# Patient Record
Sex: FEMALE | Race: WHITE | NOT HISPANIC OR LATINO | Employment: PART TIME | URBAN - METROPOLITAN AREA
[De-identification: names, ages, dates, MRNs, and addresses within clinical notes are randomized per-mention and may not be internally consistent; named-entity substitution may affect disease eponyms.]

---

## 2017-08-04 ENCOUNTER — RESULTS ENCOUNTER (OUTPATIENT)
Dept: INTERNAL MEDICINE | Facility: CLINIC | Age: 37
End: 2017-08-04

## 2017-08-04 ENCOUNTER — OFFICE VISIT (OUTPATIENT)
Dept: INTERNAL MEDICINE | Facility: CLINIC | Age: 37
End: 2017-08-04

## 2017-08-04 VITALS
WEIGHT: 181 LBS | HEART RATE: 105 BPM | HEIGHT: 61 IN | BODY MASS INDEX: 34.17 KG/M2 | DIASTOLIC BLOOD PRESSURE: 86 MMHG | SYSTOLIC BLOOD PRESSURE: 128 MMHG | OXYGEN SATURATION: 97 % | TEMPERATURE: 99.5 F

## 2017-08-04 DIAGNOSIS — G89.4 CHRONIC PAIN SYNDROME: ICD-10-CM

## 2017-08-04 DIAGNOSIS — M79.7 FIBROMYALGIA: ICD-10-CM

## 2017-08-04 DIAGNOSIS — F41.1 GAD (GENERALIZED ANXIETY DISORDER): ICD-10-CM

## 2017-08-04 DIAGNOSIS — R21 BUTTERFLY RASH: ICD-10-CM

## 2017-08-04 DIAGNOSIS — R10.9 ABDOMINAL CRAMPING: ICD-10-CM

## 2017-08-04 DIAGNOSIS — Z00.00 HEALTH CARE MAINTENANCE: ICD-10-CM

## 2017-08-04 DIAGNOSIS — D64.9 ANEMIA, UNSPECIFIED TYPE: ICD-10-CM

## 2017-08-04 DIAGNOSIS — Z00.00 HEALTH CARE MAINTENANCE: Primary | ICD-10-CM

## 2017-08-04 DIAGNOSIS — K44.9 HIATAL HERNIA: ICD-10-CM

## 2017-08-04 DIAGNOSIS — R53.82 CHRONIC FATIGUE: ICD-10-CM

## 2017-08-04 DIAGNOSIS — F32.1 MODERATE SINGLE CURRENT EPISODE OF MAJOR DEPRESSIVE DISORDER (HCC): ICD-10-CM

## 2017-08-04 DIAGNOSIS — Z79.899 CONTROLLED SUBSTANCE AGREEMENT SIGNED: ICD-10-CM

## 2017-08-04 DIAGNOSIS — K21.9 GASTROESOPHAGEAL REFLUX DISEASE WITHOUT ESOPHAGITIS: ICD-10-CM

## 2017-08-04 DIAGNOSIS — G47.419 PRIMARY NARCOLEPSY WITHOUT CATAPLEXY: ICD-10-CM

## 2017-08-04 PROCEDURE — 99385 PREV VISIT NEW AGE 18-39: CPT | Performed by: INTERNAL MEDICINE

## 2017-08-04 RX ORDER — DEXTROAMPHETAMINE SACCHARATE, AMPHETAMINE ASPARTATE, DEXTROAMPHETAMINE SULFATE AND AMPHETAMINE SULFATE 2.5; 2.5; 2.5; 2.5 MG/1; MG/1; MG/1; MG/1
10 TABLET ORAL
COMMUNITY
Start: 2017-08-13 | End: 2017-12-19

## 2017-08-04 RX ORDER — DULOXETIN HYDROCHLORIDE 30 MG/1
30 CAPSULE, DELAYED RELEASE ORAL DAILY
Qty: 90 CAPSULE | Refills: 1 | Status: SHIPPED | OUTPATIENT
Start: 2017-08-04 | End: 2017-11-02 | Stop reason: SINTOL

## 2017-08-04 RX ORDER — GABAPENTIN 100 MG/1
100 CAPSULE ORAL
COMMUNITY
Start: 2017-06-16 | End: 2017-12-19

## 2017-08-04 RX ORDER — TRAMADOL HYDROCHLORIDE 50 MG/1
50 TABLET ORAL EVERY 8 HOURS PRN
COMMUNITY
Start: 2017-06-16 | End: 2020-03-03

## 2017-08-04 RX ORDER — AMITRIPTYLINE HYDROCHLORIDE 10 MG/1
10 TABLET, FILM COATED ORAL
COMMUNITY
Start: 2017-06-16 | End: 2017-12-19

## 2017-08-05 LAB — RHEUMATOID FACT SERPL-ACNC: <10 IU/ML (ref 0–13.9)

## 2017-08-09 ENCOUNTER — RESULTS ENCOUNTER (OUTPATIENT)
Dept: INTERNAL MEDICINE | Facility: CLINIC | Age: 37
End: 2017-08-09

## 2017-08-09 DIAGNOSIS — K21.9 GASTROESOPHAGEAL REFLUX DISEASE WITHOUT ESOPHAGITIS: ICD-10-CM

## 2017-08-09 DIAGNOSIS — M79.7 FIBROMYALGIA: ICD-10-CM

## 2017-08-09 DIAGNOSIS — G89.4 CHRONIC PAIN SYNDROME: ICD-10-CM

## 2017-08-09 DIAGNOSIS — K44.9 HIATAL HERNIA: ICD-10-CM

## 2017-08-09 DIAGNOSIS — Z79.899 CONTROLLED SUBSTANCE AGREEMENT SIGNED: ICD-10-CM

## 2017-08-09 DIAGNOSIS — D64.9 ANEMIA, UNSPECIFIED TYPE: ICD-10-CM

## 2017-08-09 DIAGNOSIS — G47.419 PRIMARY NARCOLEPSY WITHOUT CATAPLEXY: ICD-10-CM

## 2017-08-09 DIAGNOSIS — Z00.00 HEALTH CARE MAINTENANCE: ICD-10-CM

## 2017-08-09 DIAGNOSIS — F41.1 GAD (GENERALIZED ANXIETY DISORDER): ICD-10-CM

## 2017-08-09 DIAGNOSIS — F32.1 MODERATE SINGLE CURRENT EPISODE OF MAJOR DEPRESSIVE DISORDER (HCC): ICD-10-CM

## 2017-08-09 DIAGNOSIS — R21 BUTTERFLY RASH: ICD-10-CM

## 2017-08-09 DIAGNOSIS — R10.9 ABDOMINAL CRAMPING: ICD-10-CM

## 2017-08-09 DIAGNOSIS — R53.82 CHRONIC FATIGUE: ICD-10-CM

## 2017-08-09 LAB
25(OH)D3+25(OH)D2 SERPL-MCNC: 19.5 NG/ML (ref 30–100)
ALBUMIN SERPL-MCNC: 4.2 G/DL (ref 3.5–5.2)
ALBUMIN/GLOB SERPL: 1.2 G/DL
ALP SERPL-CCNC: 56 U/L (ref 39–117)
ALT SERPL-CCNC: 47 U/L (ref 1–33)
APPEARANCE UR: (no result)
AST SERPL-CCNC: 26 U/L (ref 1–32)
BACTERIA #/AREA URNS HPF: ABNORMAL /HPF
BASOPHILS # BLD AUTO: 0.04 10*3/MM3 (ref 0–0.2)
BASOPHILS NFR BLD AUTO: 0.7 % (ref 0–1.5)
BILIRUB SERPL-MCNC: 0.4 MG/DL (ref 0.1–1.2)
BILIRUB UR QL STRIP: NEGATIVE
BUN SERPL-MCNC: 8 MG/DL (ref 6–20)
BUN/CREAT SERPL: 10.3 (ref 7–25)
C-ANCA TITR SER IF: NORMAL TITER
CALCIUM SERPL-MCNC: 9.7 MG/DL (ref 8.6–10.5)
CENTROMERE B AB SER-ACNC: <0.2 AI (ref 0–0.9)
CHLORIDE SERPL-SCNC: 101 MMOL/L (ref 98–107)
CHOLEST SERPL-MCNC: 171 MG/DL (ref 0–200)
CHROMATIN AB SERPL-ACNC: <0.2 AI (ref 0–0.9)
CO2 SERPL-SCNC: 23.4 MMOL/L (ref 22–29)
COLOR UR: (no result)
CREAT SERPL-MCNC: 0.78 MG/DL (ref 0.57–1)
CRP SERPL-MCNC: 0.47 MG/DL (ref 0–0.5)
DSDNA AB SER-ACNC: 1 IU/ML (ref 0–9)
ENA JO1 AB SER-ACNC: <0.2 AI (ref 0–0.9)
ENA RNP AB SER-ACNC: <0.2 AI (ref 0–0.9)
ENA SCL70 AB SER-ACNC: <0.2 AI (ref 0–0.9)
ENA SM AB SER-ACNC: <0.2 AI (ref 0–0.9)
ENA SS-A AB SER-ACNC: <0.2 AI (ref 0–0.9)
ENA SS-B AB SER-ACNC: <0.2 AI (ref 0–0.9)
EOSINOPHIL # BLD AUTO: 0.37 10*3/MM3 (ref 0–0.7)
EOSINOPHIL NFR BLD AUTO: 6.4 % (ref 0.3–6.2)
EPI CELLS #/AREA URNS HPF: ABNORMAL /HPF
ERYTHROCYTE [DISTWIDTH] IN BLOOD BY AUTOMATED COUNT: 12.4 % (ref 11.7–13)
ERYTHROCYTE [SEDIMENTATION RATE] IN BLOOD BY WESTERGREN METHOD: 8 MM/HR (ref 0–20)
FERRITIN SERPL-MCNC: 130.1 NG/ML (ref 13–150)
FOLATE SERPL-MCNC: 10.41 NG/ML (ref 4.78–24.2)
GLOBULIN SER CALC-MCNC: 3.4 GM/DL
GLUCOSE SERPL-MCNC: 118 MG/DL (ref 65–99)
GLUCOSE UR QL: NEGATIVE
HCT VFR BLD AUTO: 43.3 % (ref 35.6–45.5)
HDLC SERPL-MCNC: 43 MG/DL (ref 40–60)
HGB BLD-MCNC: 14.3 G/DL (ref 11.9–15.5)
HGB UR QL STRIP: (no result)
IMM GRANULOCYTES # BLD: 0.02 10*3/MM3 (ref 0–0.03)
IMM GRANULOCYTES NFR BLD: 0.3 % (ref 0–0.5)
IRON SATN MFR SERPL: 32 % (ref 20–50)
IRON SERPL-MCNC: 102 MCG/DL (ref 37–145)
KETONES UR QL STRIP: NEGATIVE
LDLC SERPL CALC-MCNC: 107 MG/DL (ref 0–100)
LDLC/HDLC SERPL: 2.49 {RATIO}
LEUKOCYTE ESTERASE UR QL STRIP: (no result)
LYMPHOCYTES # BLD AUTO: 2.03 10*3/MM3 (ref 0.9–4.8)
LYMPHOCYTES NFR BLD AUTO: 35.4 % (ref 19.6–45.3)
Lab: NORMAL
MCH RBC QN AUTO: 29.2 PG (ref 26.9–32)
MCHC RBC AUTO-ENTMCNC: 33 G/DL (ref 32.4–36.3)
MCV RBC AUTO: 88.5 FL (ref 80.5–98.2)
MONOCYTES # BLD AUTO: 0.4 10*3/MM3 (ref 0.2–1.2)
MONOCYTES NFR BLD AUTO: 7 % (ref 5–12)
MYELOPEROXIDASE AB SER IA-ACNC: <9 U/ML (ref 0–9)
NEUTROPHILS # BLD AUTO: 2.88 10*3/MM3 (ref 1.9–8.1)
NEUTROPHILS NFR BLD AUTO: 50.2 % (ref 42.7–76)
NITRITE UR QL STRIP: NEGATIVE
P-ANCA ATYPICAL TITR SER IF: NORMAL TITER
P-ANCA TITR SER IF: NORMAL TITER
PH UR STRIP: 5.5 [PH] (ref 5–8)
PLATELET # BLD AUTO: 330 10*3/MM3 (ref 140–500)
POTASSIUM SERPL-SCNC: 4.2 MMOL/L (ref 3.5–5.2)
PROT SERPL-MCNC: 7.6 G/DL (ref 6–8.5)
PROT UR QL STRIP: (no result)
PROTEINASE3 AB SER IA-ACNC: <3.5 U/ML (ref 0–3.5)
RBC # BLD AUTO: 4.89 10*6/MM3 (ref 3.9–5.2)
RBC #/AREA URNS HPF: ABNORMAL /HPF
SODIUM SERPL-SCNC: 140 MMOL/L (ref 136–145)
SP GR UR: 1.02 (ref 1–1.03)
T4 FREE SERPL-MCNC: 0.9 NG/DL (ref 0.93–1.7)
TIBC SERPL-MCNC: 315 MCG/DL
TRIGL SERPL-MCNC: 104 MG/DL (ref 0–150)
TSH SERPL DL<=0.005 MIU/L-ACNC: 0.77 MIU/ML (ref 0.27–4.2)
UIBC SERPL-MCNC: 213 MCG/DL
UROBILINOGEN UR STRIP-MCNC: (no result) MG/DL
VIT B12 SERPL-MCNC: 409 PG/ML (ref 211–946)
VLDLC SERPL CALC-MCNC: 20.8 MG/DL (ref 5–40)
WBC # BLD AUTO: 5.74 10*3/MM3 (ref 4.5–10.7)
WBC #/AREA URNS HPF: ABNORMAL /HPF

## 2017-08-10 RX ORDER — ERGOCALCIFEROL 1.25 MG/1
50000 CAPSULE ORAL WEEKLY
Qty: 4 CAPSULE | Refills: 11 | Status: SHIPPED | OUTPATIENT
Start: 2017-08-10 | End: 2017-11-22 | Stop reason: SDUPTHER

## 2017-08-20 NOTE — PROGRESS NOTES
Subjective   Skyla Willams is a 37 y.o. female.   She is here today for complete physical exam except for gynecological part along with generalized anxiety disorder chronic pain syndrome chronic fatigue abdominal cramping narcolepsy without cataplexy depression fibromyalgia GERD hiatal hernia control substance agreement signed butterfly rash and anemia  History of Present Illness   She is here today for complete physical exam except for gynecological part along general his anxiety disorder chronic pain syndrome chronic fatigue abdominal cramping narcolepsy without cataplexy depression fibromyalgia GERD hiatal hernia control substance agreement signed and butterfly rash and anemia  The following portions of the patient's history were reviewed and updated as appropriate: allergies, current medications, past family history, past medical history, past social history, past surgical history and problem list.    Review of Systems   Constitutional: Positive for fatigue.   Genitourinary:        Abdominal cramping   Musculoskeletal: Positive for arthralgias and myalgias.   Skin: Positive for rash (butterfly rash at times but t today).   Psychiatric/Behavioral: Positive for dysphoric mood. The patient is nervous/anxious.    All other systems reviewed and are negative.      Objective   Physical Exam   Constitutional: She is oriented to person, place, and time. Vital signs are normal. She appears well-developed and well-nourished. She is active.   HENT:   Head: Normocephalic and atraumatic.   Right Ear: Hearing, tympanic membrane, external ear and ear canal normal.   Left Ear: Hearing, tympanic membrane, external ear and ear canal normal.   Nose: Nose normal.   Mouth/Throat: Uvula is midline, oropharynx is clear and moist and mucous membranes are normal.   Eyes: Conjunctivae, EOM and lids are normal. Pupils are equal, round, and reactive to light. Right eye exhibits no discharge. Left eye exhibits no discharge.   Neck: Trachea  normal, normal range of motion, full passive range of motion without pain and phonation normal. Neck supple. Carotid bruit is not present. No edema present. No thyroid mass and no thyromegaly present.   Cardiovascular: Normal rate, regular rhythm, normal heart sounds, intact distal pulses and normal pulses.  Exam reveals no gallop and no friction rub.    No murmur heard.  Pulmonary/Chest: Effort normal and breath sounds normal. No respiratory distress. She has no wheezes. She has no rales.   Abdominal: Soft. Normal appearance, normal aorta and bowel sounds are normal. She exhibits no distension, no abdominal bruit and no mass. There is no hepatosplenomegaly. There is no tenderness. There is no rebound, no guarding and no CVA tenderness. No hernia. Hernia confirmed negative in the right inguinal area and confirmed negative in the left inguinal area.   Musculoskeletal: Normal range of motion. She exhibits no edema or tenderness.       Vascular Status -  Her exam exhibits right foot vasculature normal. Her exam exhibits no right foot edema. Her exam exhibits left foot vasculature normal. Her exam exhibits no left foot edema.   Skin Integrity  -  Her right foot skin is intact.     Skyla 's left foot skin is intact. .  Lymphadenopathy:     She has no cervical adenopathy.     She has no axillary adenopathy.        Right: No inguinal and no supraclavicular adenopathy present.        Left: No inguinal and no supraclavicular adenopathy present.   Neurological: She is alert and oriented to person, place, and time. She has normal strength. No cranial nerve deficit or sensory deficit. She exhibits normal muscle tone. She displays a negative Romberg sign. Coordination normal.   Skin: Skin is warm, dry and intact. No cyanosis. Nails show no clubbing.   Psychiatric: Her speech is normal and behavior is normal. Judgment and thought content normal. Her mood appears anxious. Cognition and memory are normal.   Nursing note and vitals  reviewed.      Assessment/Plan   Diagnoses and all orders for this visit:    [facility]  -     Ambulatory Referral to Gynecology  -     Lipid Panel With LDL / HDL Ratio; Future  -     CBC & Differential; Future  -     Comprehensive Metabolic Panel; Future  -     T4, Free; Future  -     TSH; Future  -     Urinalysis With Microscopic; Future  -     [facility] Comprehensive Panel; Future  -     ANCA Panel; Future  -     C-reactive Protein; Future  -     Rheumatoid Factor, Quant  -     Sedimentation Rate; Future  -     Iron and TIBC; Future  -     Ferritin; Future  -     Folate; Future  -     Vitamin D 25 Hydroxy; Future  -     Vitamin B12; Future    JULIEN (generalized anxiety disorder)  -     Lipid Panel With LDL / HDL Ratio; Future  -     CBC & Differential; Future  -     Comprehensive Metabolic Panel; Future  -     T4, Free; Future  -     TSH; Future  -     Urinalysis With Microscopic; Future  -     KEISHA Comprehensive Panel; Future  -     ANCA Panel; Future  -     C-reactive Protein; Future  -     Rheumatoid Factor, Quant  -     Sedimentation Rate; Future  -     Iron and TIBC; Future  -     Ferritin; Future  -     Folate; Future  -     Vitamin D 25 Hydroxy; Future  -     Vitamin B12; Future    Chronic pain syndrome  -     Lipid Panel With LDL / HDL Ratio; Future  -     CBC & Differential; Future  -     Comprehensive Metabolic Panel; Future  -     T4, Free; Future  -     TSH; Future  -     Urinalysis With Microscopic; Future  -     KEISHA Comprehensive Panel; Future  -     ANCA Panel; Future  -     C-reactive Protein; Future  -     Rheumatoid Factor, Quant  -     Sedimentation Rate; Future  -     Iron and TIBC; Future  -     Ferritin; Future  -     Folate; Future  -     Vitamin D 25 Hydroxy; Future  -     Vitamin B12; Future    Chronic fatigue  -     Lipid Panel With LDL / HDL Ratio; Future  -     CBC & Differential; Future  -     Comprehensive Metabolic Panel; Future  -     T4, Free; Future  -     TSH; Future  -      Urinalysis With Microscopic; Future  -     KEISHA Comprehensive Panel; Future  -     ANCA Panel; Future  -     C-reactive Protein; Future  -     Rheumatoid Factor, Quant  -     Sedimentation Rate; Future  -     Iron and TIBC; Future  -     Ferritin; Future  -     Folate; Future  -     Vitamin D 25 Hydroxy; Future  -     Vitamin B12; Future    Abdominal cramping  -     Lipid Panel With LDL / HDL Ratio; Future  -     CBC & Differential; Future  -     Comprehensive Metabolic Panel; Future  -     T4, Free; Future  -     TSH; Future  -     Urinalysis With Microscopic; Future  -     KEISHA Comprehensive Panel; Future  -     ANCA Panel; Future  -     C-reactive Protein; Future  -     Rheumatoid Factor, Quant  -     Sedimentation Rate; Future  -     Iron and TIBC; Future  -     Ferritin; Future  -     Folate; Future  -     Vitamin D 25 Hydroxy; Future  -     Vitamin B12; Future    Primary narcolepsy without cataplexy  -     Lipid Panel With LDL / HDL Ratio; Future  -     CBC & Differential; Future  -     Comprehensive Metabolic Panel; Future  -     T4, Free; Future  -     TSH; Future  -     Urinalysis With Microscopic; Future  -     KEISHA Comprehensive Panel; Future  -     ANCA Panel; Future  -     C-reactive Protein; Future  -     Rheumatoid Factor, Quant  -     Sedimentation Rate; Future  -     Iron and TIBC; Future  -     Ferritin; Future  -     Folate; Future  -     Vitamin D 25 Hydroxy; Future  -     Vitamin B12; Future  -     Ambulatory Referral to Sleep Medicine    Moderate single current episode of major depressive disorder  -     Lipid Panel With LDL / HDL Ratio; Future  -     CBC & Differential; Future  -     Comprehensive Metabolic Panel; Future  -     T4, Free; Future  -     TSH; Future  -     Urinalysis With Microscopic; Future  -     KEISHA Comprehensive Panel; Future  -     ANCA Panel; Future  -     C-reactive Protein; Future  -     Rheumatoid Factor, Quant  -     Sedimentation Rate; Future  -     Iron and TIBC;  Future  -     Ferritin; Future  -     Folate; Future  -     Vitamin D 25 Hydroxy; Future  -     Vitamin B12; Future    Fibromyalgia  -     Lipid Panel With LDL / HDL Ratio; Future  -     CBC & Differential; Future  -     Comprehensive Metabolic Panel; Future  -     T4, Free; Future  -     TSH; Future  -     Urinalysis With Microscopic; Future  -     KEISHA Comprehensive Panel; Future  -     ANCA Panel; Future  -     C-reactive Protein; Future  -     Rheumatoid Factor, Quant  -     Sedimentation Rate; Future  -     Iron and TIBC; Future  -     Ferritin; Future  -     Folate; Future  -     Vitamin D 25 Hydroxy; Future  -     Vitamin B12; Future    Gastroesophageal reflux disease without esophagitis  -     Lipid Panel With LDL / HDL Ratio; Future  -     CBC & Differential; Future  -     Comprehensive Metabolic Panel; Future  -     T4, Free; Future  -     TSH; Future  -     Urinalysis With Microscopic; Future  -     KEISHA Comprehensive Panel; Future  -     ANCA Panel; Future  -     C-reactive Protein; Future  -     Rheumatoid Factor, Quant  -     Sedimentation Rate; Future  -     Iron and TIBC; Future  -     Ferritin; Future  -     Folate; Future  -     Vitamin D 25 Hydroxy; Future  -     Vitamin B12; Future    Hiatal hernia  -     Lipid Panel With LDL / HDL Ratio; Future  -     CBC & Differential; Future  -     Comprehensive Metabolic Panel; Future  -     T4, Free; Future  -     TSH; Future  -     Urinalysis With Microscopic; Future  -     KE[facility] Comprehensive Panel; Future  -     ANCA Panel; Future  -     C-reactive Protein; Future  -     Rheumatoid Factor, Quant  -     Sedimentation Rate; Future  -     Iron and TIBC; Future  -     Ferritin; Future  -     Folate; Future  -     Vitamin D 25 Hydroxy; Future  -     Vitamin B12; Future    Controlled substance agreement signed  -     Lipid Panel With LDL / HDL Ratio; Future  -     CBC & Differential; Future  -     Comprehensive Metabolic Panel; Future  -     T4, Free; Future  -      TSH; Future  -     Urinalysis With Microscopic; Future  -     KEISHA Comprehensive Panel; Future  -     ANCA Panel; Future  -     C-reactive Protein; Future  -     Rheumatoid Factor, Quant  -     Sedimentation Rate; Future  -     Iron and TIBC; Future  -     Ferritin; Future  -     Folate; Future  -     Vitamin D 25 Hydroxy; Future  -     Vitamin B12; Future    Butterfly rash  -     Lipid Panel With LDL / HDL Ratio; Future  -     CBC & Differential; Future  -     Comprehensive Metabolic Panel; Future  -     T4, Free; Future  -     TSH; Future  -     Urinalysis With Microscopic; Future  -     KEISHA Comprehensive Panel; Future  -     ANCA Panel; Future  -     C-reactive Protein; Future  -     Rheumatoid Factor, Quant  -     Sedimentation Rate; Future  -     Iron and TIBC; Future  -     Ferritin; Future  -     Folate; Future  -     Vitamin D 25 Hydroxy; Future  -     Vitamin B12; Future    Anemia, unspecified type  -     Iron and TIBC; Future  -     Ferritin; Future  -     Folate; Future  -     Vitamin D 25 Hydroxy; Future  -     Vitamin B12; Future    Other orders  -     DULoxetine (CYMBALTA) 30 MG capsule; Take 1 capsule by mouth Daily.        Healthcare maintenance fasting labs vaccines pelvic exams on a regular basis  Anemia check labs  Depression medication  Butterfly rash noted  Control substance agreement signed today  -Hernia noted weight loss would be beneficial  GERD without esophagitis supportive meds physical therapy weight loss  Fibromyalgia supportive meds physical therapy  Depression medication for this as well  Narcolepsy sleep studies  Fatigue check labs  Generalized anxiety disorder supportive meds for this  Abdominal cramping medication for this as well

## 2017-09-28 ENCOUNTER — APPOINTMENT (OUTPATIENT)
Dept: SLEEP MEDICINE | Facility: HOSPITAL | Age: 37
End: 2017-09-28
Attending: INTERNAL MEDICINE

## 2017-11-02 ENCOUNTER — OFFICE VISIT (OUTPATIENT)
Dept: INTERNAL MEDICINE | Facility: CLINIC | Age: 37
End: 2017-11-02

## 2017-11-02 VITALS
OXYGEN SATURATION: 97 % | HEIGHT: 61 IN | SYSTOLIC BLOOD PRESSURE: 133 MMHG | WEIGHT: 185 LBS | HEART RATE: 107 BPM | TEMPERATURE: 98.5 F | BODY MASS INDEX: 34.93 KG/M2 | DIASTOLIC BLOOD PRESSURE: 89 MMHG

## 2017-11-02 DIAGNOSIS — G90.1 DYSAUTONOMIA (HCC): ICD-10-CM

## 2017-11-02 DIAGNOSIS — F41.1 GAD (GENERALIZED ANXIETY DISORDER): ICD-10-CM

## 2017-11-02 DIAGNOSIS — M79.7 FIBROMYALGIA: Primary | ICD-10-CM

## 2017-11-02 DIAGNOSIS — K21.9 GASTROESOPHAGEAL REFLUX DISEASE WITHOUT ESOPHAGITIS: ICD-10-CM

## 2017-11-02 DIAGNOSIS — G89.4 CHRONIC PAIN SYNDROME: ICD-10-CM

## 2017-11-02 DIAGNOSIS — R53.82 CHRONIC FATIGUE: ICD-10-CM

## 2017-11-02 PROCEDURE — 99214 OFFICE O/P EST MOD 30 MIN: CPT | Performed by: INTERNAL MEDICINE

## 2017-11-02 RX ORDER — DEXTROAMPHETAMINE SACCHARATE, AMPHETAMINE ASPARTATE, DEXTROAMPHETAMINE SULFATE AND AMPHETAMINE SULFATE 2.5; 2.5; 2.5; 2.5 MG/1; MG/1; MG/1; MG/1
10 TABLET ORAL DAILY
Qty: 30 TABLET | Refills: 0 | Status: CANCELLED | OUTPATIENT
Start: 2017-11-02

## 2017-11-02 RX ORDER — AMITRIPTYLINE HYDROCHLORIDE 10 MG/1
10 TABLET, FILM COATED ORAL NIGHTLY
Qty: 30 TABLET | Refills: 0 | Status: CANCELLED | OUTPATIENT
Start: 2017-11-02

## 2017-11-03 LAB
ALBUMIN SERPL-MCNC: 4.9 G/DL (ref 3.5–5.2)
ALBUMIN/GLOB SERPL: 1.4 G/DL
ALP SERPL-CCNC: 57 U/L (ref 39–117)
ALT SERPL-CCNC: 79 U/L (ref 1–33)
APPEARANCE UR: (no result)
AST SERPL-CCNC: 49 U/L (ref 1–32)
BACTERIA #/AREA URNS HPF: ABNORMAL /HPF
BASOPHILS # BLD AUTO: 0.05 10*3/MM3 (ref 0–0.2)
BASOPHILS NFR BLD AUTO: 0.6 % (ref 0–1.5)
BILIRUB SERPL-MCNC: 0.4 MG/DL (ref 0.1–1.2)
BILIRUB UR QL STRIP: NEGATIVE
BUN SERPL-MCNC: 7 MG/DL (ref 6–20)
BUN/CREAT SERPL: 8.4 (ref 7–25)
CALCIUM SERPL-MCNC: 10.1 MG/DL (ref 8.6–10.5)
CHLORIDE SERPL-SCNC: 100 MMOL/L (ref 98–107)
CO2 SERPL-SCNC: 25.8 MMOL/L (ref 22–29)
COLOR UR: YELLOW
CREAT SERPL-MCNC: 0.83 MG/DL (ref 0.57–1)
EOSINOPHIL # BLD AUTO: 0.36 10*3/MM3 (ref 0–0.7)
EOSINOPHIL NFR BLD AUTO: 4.2 % (ref 0.3–6.2)
EPI CELLS #/AREA URNS HPF: ABNORMAL /HPF
ERYTHROCYTE [DISTWIDTH] IN BLOOD BY AUTOMATED COUNT: 12.6 % (ref 11.7–13)
GFR SERPLBLD CREATININE-BSD FMLA CKD-EPI: 78 ML/MIN/1.73
GFR SERPLBLD CREATININE-BSD FMLA CKD-EPI: 94 ML/MIN/1.73
GLOBULIN SER CALC-MCNC: 3.4 GM/DL
GLUCOSE SERPL-MCNC: 97 MG/DL (ref 65–99)
GLUCOSE UR QL: NEGATIVE
HCT VFR BLD AUTO: 45 % (ref 35.6–45.5)
HGB BLD-MCNC: 15.2 G/DL (ref 11.9–15.5)
HGB UR QL STRIP: (no result)
IMM GRANULOCYTES # BLD: 0.02 10*3/MM3 (ref 0–0.03)
IMM GRANULOCYTES NFR BLD: 0.2 % (ref 0–0.5)
KETONES UR QL STRIP: NEGATIVE
LEUKOCYTE ESTERASE UR QL STRIP: (no result)
LYMPHOCYTES # BLD AUTO: 2.98 10*3/MM3 (ref 0.9–4.8)
LYMPHOCYTES NFR BLD AUTO: 35.1 % (ref 19.6–45.3)
MCH RBC QN AUTO: 29.6 PG (ref 26.9–32)
MCHC RBC AUTO-ENTMCNC: 33.8 G/DL (ref 32.4–36.3)
MCV RBC AUTO: 87.5 FL (ref 80.5–98.2)
MONOCYTES # BLD AUTO: 0.71 10*3/MM3 (ref 0.2–1.2)
MONOCYTES NFR BLD AUTO: 8.4 % (ref 5–12)
NEUTROPHILS # BLD AUTO: 4.38 10*3/MM3 (ref 1.9–8.1)
NEUTROPHILS NFR BLD AUTO: 51.5 % (ref 42.7–76)
NITRITE UR QL STRIP: NEGATIVE
PH UR STRIP: 6 [PH] (ref 5–8)
PLATELET # BLD AUTO: 446 10*3/MM3 (ref 140–500)
POTASSIUM SERPL-SCNC: 4.3 MMOL/L (ref 3.5–5.2)
PROT SERPL-MCNC: 8.3 G/DL (ref 6–8.5)
PROT UR QL STRIP: NEGATIVE
RBC # BLD AUTO: 5.14 10*6/MM3 (ref 3.9–5.2)
RBC #/AREA URNS HPF: ABNORMAL /HPF
SODIUM SERPL-SCNC: 141 MMOL/L (ref 136–145)
SP GR UR: 1.01 (ref 1–1.03)
T4 FREE SERPL-MCNC: 0.99 NG/DL (ref 0.93–1.7)
TSH SERPL DL<=0.005 MIU/L-ACNC: 1.33 MIU/ML (ref 0.27–4.2)
UROBILINOGEN UR STRIP-MCNC: (no result) MG/DL
VIT B12 SERPL-MCNC: 594 PG/ML (ref 211–946)
WBC # BLD AUTO: 8.5 10*3/MM3 (ref 4.5–10.7)
WBC #/AREA URNS HPF: ABNORMAL /HPF

## 2017-11-17 ENCOUNTER — OFFICE VISIT (OUTPATIENT)
Dept: ENDOCRINOLOGY | Age: 37
End: 2017-11-17

## 2017-11-17 VITALS
RESPIRATION RATE: 16 BRPM | BODY MASS INDEX: 34.59 KG/M2 | WEIGHT: 183.2 LBS | SYSTOLIC BLOOD PRESSURE: 124 MMHG | DIASTOLIC BLOOD PRESSURE: 78 MMHG | HEIGHT: 61 IN

## 2017-11-17 DIAGNOSIS — R53.82 CHRONIC FATIGUE: Primary | ICD-10-CM

## 2017-11-17 DIAGNOSIS — E66.9 CLASS 1 OBESITY WITHOUT SERIOUS COMORBIDITY WITH BODY MASS INDEX (BMI) OF 33.0 TO 33.9 IN ADULT, UNSPECIFIED OBESITY TYPE: ICD-10-CM

## 2017-11-17 DIAGNOSIS — E55.9 VITAMIN D DEFICIENCY: ICD-10-CM

## 2017-11-17 DIAGNOSIS — M79.7 FIBROMYALGIA: ICD-10-CM

## 2017-11-17 DIAGNOSIS — N92.6 IRREGULAR MENSES: ICD-10-CM

## 2017-11-17 DIAGNOSIS — G89.29 OTHER CHRONIC PAIN: ICD-10-CM

## 2017-11-17 DIAGNOSIS — G90.1 DYSAUTONOMIA (HCC): ICD-10-CM

## 2017-11-17 PROCEDURE — 99205 OFFICE O/P NEW HI 60 MIN: CPT | Performed by: INTERNAL MEDICINE

## 2017-11-17 RX ORDER — FLUDROCORTISONE ACETATE 0.1 MG/1
0.1 TABLET ORAL DAILY
Qty: 30 TABLET | Refills: 3 | Status: SHIPPED | OUTPATIENT
Start: 2017-11-17 | End: 2018-05-07 | Stop reason: SDUPTHER

## 2017-11-17 NOTE — PROGRESS NOTES
"Subjective   Skyla Willams is a 37 y.o. female seen as a new patient for dysautonomia.She is having fatigue, dizziness, itching, weakness, poor memory, irregular periods.    History of Present Illness this is a 37-year-old female who has been referred because of having diagnosis of dysautonomia since 1998 for further evaluation and and treatment of any other possible hormonal problems.  She said she had been having passing out spells since age 8 however at age 18 when she became pregnant she was diagnosed as dysautonomia and manage her problem by drinking enough fluid and taking salt tablets.  Right now her daughter age 19 has been diagnosed to have postural orthostatic tachycardia syndrome.  She also has very strong family history with type II diabetes and high cholesterol and cardiovascular problems.  Also lots of autoimmune problems as well as her sister recently was diagnosed with myasthenia gravis and underwent removal oral of her thymus.  She has always had a sickly disposition missing some days from her school and missing work days and feeling that world is moving faster than her.  She said between ages of 8 and 19 when she was diagnosed she might have had falls that had caused her to have trauma to her head.    /78  Resp 16  Ht 61\" (154.9 cm)  Wt 183 lb 3.2 oz (83.1 kg)  BMI 34.62 kg/m2     Allergies   Allergen Reactions   • Cymbalta [Duloxetine Hcl]    • Penicillins Nausea Only       Current Outpatient Prescriptions:   •  amitriptyline (ELAVIL) 10 MG tablet, Take 10 mg by mouth., Disp: , Rfl:   •  amphetamine-dextroamphetamine (ADDERALL) 10 MG tablet, Take 10 mg by mouth., Disp: , Rfl:   •  ergocalciferol (ERGOCALCIFEROL) 73932 UNITS capsule, Take 1 capsule by mouth 1 (One) Time Per Week., Disp: 4 capsule, Rfl: 11  •  gabapentin (NEURONTIN) 100 MG capsule, Take 100 mg by mouth., Disp: , Rfl:   •  traMADol (ULTRAM) 50 MG tablet, Take 50 mg by mouth., Disp: , Rfl:       The following portions of the " patient's history were reviewed and updated as appropriate: allergies, current medications, past family history, past medical history, past social history, past surgical history and problem list.    Review of Systems   Constitutional: Positive for fatigue.   HENT: Negative.    Eyes: Negative.    Respiratory: Negative.    Cardiovascular: Negative.    Gastrointestinal: Negative.    Endocrine: Negative.    Genitourinary: Positive for menstrual problem.   Musculoskeletal: Negative.    Skin: Negative.    Allergic/Immunologic: Negative.    Neurological: Positive for dizziness and weakness.   Hematological: Negative.    Psychiatric/Behavioral: Positive for confusion and decreased concentration. The patient is nervous/anxious.        Objective   Physical Exam   Constitutional: She is oriented to person, place, and time. She appears well-developed and well-nourished. No distress.   HENT:   Head: Normocephalic and atraumatic.   Right Ear: External ear normal.   Left Ear: External ear normal.   Nose: Nose normal.   Mouth/Throat: Oropharynx is clear and moist. No oropharyngeal exudate.   Eyes: Conjunctivae and EOM are normal. Pupils are equal, round, and reactive to light. Right eye exhibits no discharge. Left eye exhibits no discharge. No scleral icterus.   Neck: Normal range of motion. Neck supple. No JVD present. No tracheal deviation present. No thyromegaly present.   Cardiovascular: Normal rate, regular rhythm, normal heart sounds and intact distal pulses.  Exam reveals no gallop and no friction rub.    No murmur heard.  Pulmonary/Chest: Effort normal and breath sounds normal. No stridor. No respiratory distress. She has no wheezes. She has no rales. She exhibits no tenderness.   Abdominal: Soft. Bowel sounds are normal. She exhibits no distension and no mass. There is no tenderness. There is no rebound and no guarding. No hernia.   Musculoskeletal: Normal range of motion. She exhibits no edema, tenderness or deformity.    Lymphadenopathy:     She has no cervical adenopathy.   Neurological: She is alert and oriented to person, place, and time. She has normal reflexes. She displays normal reflexes. No cranial nerve deficit. She exhibits normal muscle tone. Coordination normal.   Skin: Skin is warm and dry. No rash noted. She is not diaphoretic. No erythema. No pallor.   Light hirsutism on her chin as well as light acanthosis nigricans around her neck.   Psychiatric: She has a normal mood and affect. Her behavior is normal. Judgment and thought content normal.   Nursing note and vitals reviewed.        Assessment/Plan   Diagnoses and all orders for this visit:    Chronic fatigue  -     T3, Free  -     T4 & TSH ([facility])  -     T4, Free  -     Uric Acid  -     Vitamin D 25 Hydroxy  -     Comprehensive Metabolic Panel  -     C-Peptide  -     Thyroglobulin With Anti-TG  -     Follicle Stimulating Hormone  -     Hemoglobin A1c  -     Lipid Panel  -     Luteinizing Hormone  -     Prolactin  -     ACTH  -     Cortisol  -     Catecholamines, Fractionated, Plasma  -     Aldosterone  -     Calcium, Ionized  -     Phosphorus  -     PTH, Intact  -     Growth Hormone  -     Insulin-like Growth Factor    Fibromyalgia  -     T3, Free  -     T4 & TSH ([facility])  -     T4, Free  -     Uric Acid  -     Vitamin D 25 Hydroxy  -     Comprehensive Metabolic Panel  -     C-Peptide  -     Thyroglobulin With Anti-TG  -     Follicle Stimulating Hormone  -     Hemoglobin A1c  -     Lipid Panel  -     Luteinizing Hormone  -     Prolactin  -     ACTH  -     Cortisol  -     Catecholamines, Fractionated, Plasma  -     Aldosterone  -     Calcium, Ionized  -     Phosphorus  -     PTH, Intact  -     Growth Hormone  -     Insulin-like Growth Factor    Dysautonomia  -     T3, Free  -     T4 & TSH ([facility])  -     T4, Free  -     Uric Acid  -     Vitamin D 25 Hydroxy  -     Comprehensive Metabolic Panel  -     C-Peptide  -     Thyroglobulin With Anti-TG  -     Follicle  Stimulating Hormone  -     Hemoglobin A1c  -     Lipid Panel  -     Luteinizing Hormone  -     Prolactin  -     ACTH  -     Cortisol  -     Catecholamines, Fractionated, Plasma  -     Aldosterone  -     Calcium, Ionized  -     Phosphorus  -     PTH, Intact  -     Growth Hormone  -     Insulin-like Growth Factor    Vitamin D deficiency  -     T3, Free  -     T4 & TSH ([facility])  -     T4, Free  -     Uric Acid  -     Vitamin D 25 Hydroxy  -     Comprehensive Metabolic Panel  -     C-Peptide  -     Thyroglobulin With Anti-TG  -     Follicle Stimulating Hormone  -     Hemoglobin A1c  -     Lipid Panel  -     Luteinizing Hormone  -     Prolactin  -     ACTH  -     Cortisol  -     Catecholamines, Fractionated, Plasma  -     Aldosterone  -     Calcium, Ionized  -     Phosphorus  -     PTH, Intact  -     Growth Hormone  -     Insulin-like Growth Factor    Other chronic pain  -     T3, Free  -     T4 & TSH ([facility])  -     T4, Free  -     Uric Acid  -     Vitamin D 25 Hydroxy  -     Comprehensive Metabolic Panel  -     C-Peptide  -     Thyroglobulin With Anti-TG  -     Follicle Stimulating Hormone  -     Hemoglobin A1c  -     Lipid Panel  -     Luteinizing Hormone  -     Prolactin  -     ACTH  -     Cortisol  -     Catecholamines, Fractionated, Plasma  -     Aldosterone  -     Calcium, Ionized  -     Phosphorus  -     PTH, Intact  -     Growth Hormone  -     Insulin-like Growth Factor    Class 1 obesity without serious comorbidity with body mass index (BMI) of 33.0 to 33.9 in adult, unspecified obesity type  -     T3, Free  -     T4 & TSH ([facility])  -     T4, Free  -     Uric Acid  -     Vitamin D 25 Hydroxy  -     Comprehensive Metabolic Panel  -     C-Peptide  -     Thyroglobulin With Anti-TG  -     Follicle Stimulating Hormone  -     Hemoglobin A1c  -     Lipid Panel  -     Luteinizing Hormone  -     Prolactin  -     ACTH  -     Cortisol  -     Catecholamines, Fractionated, Plasma  -     Aldosterone  -     Calcium,  Ionized  -     Phosphorus  -     PTH, Intact  -     Growth Hormone  -     Insulin-like Growth Factor    Irregular menses    Other orders  -     fludrocortisone 0.1 MG tablet; Take 1 tablet by mouth Daily.               In summary I saw and examined this 37-year-old female for above-mentioned problems.  I reviewed her laboratory evaluations that were done at Dr. Martinez's office dated 08/07/2017 and 11/02/2017.  And at this time I am going to go ahead and order a more extensive hormonal and metabolic evaluation and as soon as the results come back we will go ahead and call her for any possible modification or new medications or further evaluations.  Also as an experiment I am going to go ahead and give her Florinef 0.1 mg to be taken every morning.  This office visit including patient counseling and education and which encompassed 50% of the time lasted 60 minutes.  I will see her in 3 months or sooner if needed with laboratory evaluation prior to each office visit.

## 2017-11-19 NOTE — PROGRESS NOTES
Subjective   Skyla Willams is a 37 y.o. female.   She is here today for follow-up for fibromyalgia GERD JULIEN chronic pain syndrome chronic fatigue dysautonomia  History of Present Illness   She is here today for follow-up for fibromyalgia GERD JULIEN chronic pain syndrome chronic fatigue and dysautonomia and she currently is on meds for ADD vitamin E deficiency fibromyalgia chronic pain syndrome and dysautonomia as well  The following portions of the patient's history were reviewed and updated as appropriate: allergies, current medications, past family history, past medical history, past social history, past surgical history and problem list.    Review of Systems   Constitutional: Positive for fatigue.   Musculoskeletal: Positive for arthralgias and myalgias.   Neurological: Positive for weakness and light-headedness.   Psychiatric/Behavioral: Positive for decreased concentration.   All other systems reviewed and are negative.      Objective   Physical Exam   Constitutional: She is oriented to person, place, and time. She appears well-developed and well-nourished. She is cooperative.   HENT:   Head: Normocephalic and atraumatic.   Right Ear: Hearing, tympanic membrane, external ear and ear canal normal.   Left Ear: Hearing, tympanic membrane, external ear and ear canal normal.   Nose: Nose normal.   Mouth/Throat: Uvula is midline, oropharynx is clear and moist and mucous membranes are normal.   Eyes: Conjunctivae, EOM and lids are normal. Pupils are equal, round, and reactive to light.   Neck: Phonation normal. Neck supple. Carotid bruit is not present.   Cardiovascular: Normal rate, regular rhythm and normal heart sounds.  Exam reveals no gallop and no friction rub.    No murmur heard.  Pulmonary/Chest: Effort normal and breath sounds normal. No respiratory distress.   Abdominal: Soft. Bowel sounds are normal. She exhibits no distension and no mass. There is no hepatosplenomegaly. There is no tenderness. There is no  rebound and no guarding. No hernia.   Musculoskeletal: She exhibits no edema.   Neurological: She is alert and oriented to person, place, and time. Coordination and gait normal.   Dysautonomia/she gets lightheaded at times   Skin: Skin is warm and dry.   Psychiatric: Her speech is normal and behavior is normal. Judgment and thought content normal. Her mood appears anxious.   Nursing note and vitals reviewed.      Assessment/Plan   Diagnoses and all orders for this visit:    Fibromyalgia  -     Comprehensive Metabolic Panel  -     CBC & Differential  -     T4, Free  -     TSH  -     Urinalysis With Microscopic - Urine, Clean Catch  -     Vitamin B12    Gastroesophageal reflux disease without esophagitis  -     Comprehensive Metabolic Panel  -     CBC & Differential  -     T4, Free  -     TSH  -     Urinalysis With Microscopic - Urine, Clean Catch  -     Vitamin B12    JULIEN (generalized anxiety disorder)  -     Comprehensive Metabolic Panel  -     CBC & Differential  -     T4, Free  -     TSH  -     Urinalysis With Microscopic - Urine, Clean Catch  -     Vitamin B12    Chronic pain syndrome  -     Comprehensive Metabolic Panel  -     CBC & Differential  -     T4, Free  -     TSH  -     Urinalysis With Microscopic - Urine, Clean Catch  -     Vitamin B12    Chronic fatigue  -     Comprehensive Metabolic Panel  -     CBC & Differential  -     T4, Free  -     TSH  -     Urinalysis With Microscopic - Urine, Clean Catch  -     Vitamin B12    Dysautonomia  -     Ambulatory Referral to Endocrinology    Other orders  -     Cancel: amphetamine-dextroamphetamine (ADDERALL) 10 MG tablet; Take 1 tablet by mouth Daily.  -     Cancel: amitriptyline (ELAVIL) 10 MG tablet; Take 1 tablet by mouth Every Night.  -     Microscopic Examination      Fibromyalgia supportive meds and massages and muscle relaxers as needed and stress release  Chronic pain syndrome supportive meds physical therapy  Chronic fatigue check labs including  B12  Dysautonomia refer to endocrinology  Generalized anxiety disorder supportive meds and therapy as needed  GERD stable on current medication including over-the-counter proton pump inhibitor

## 2017-11-20 ENCOUNTER — DOCUMENTATION (OUTPATIENT)
Dept: INTERNAL MEDICINE | Facility: CLINIC | Age: 37
End: 2017-11-20

## 2017-11-20 DIAGNOSIS — R74.8 ELEVATED LIVER ENZYMES: Primary | ICD-10-CM

## 2017-11-22 LAB
25(OH)D3+25(OH)D2 SERPL-MCNC: 23 NG/ML (ref 30–100)
ACTH PLAS-MCNC: 32 PG/ML (ref 7.2–63.3)
ALBUMIN SERPL-MCNC: 4.8 G/DL (ref 3.5–5.2)
ALBUMIN/GLOB SERPL: 1.5 G/DL
ALDOST SERPL-MCNC: 39.2 NG/DL (ref 0–30)
ALP SERPL-CCNC: 55 U/L (ref 39–117)
ALT SERPL-CCNC: 68 U/L (ref 1–33)
AST SERPL-CCNC: 35 U/L (ref 1–32)
BILIRUB SERPL-MCNC: 0.4 MG/DL (ref 0.1–1.2)
BUN SERPL-MCNC: 9 MG/DL (ref 6–20)
BUN/CREAT SERPL: 11.1 (ref 7–25)
C PEPTIDE SERPL-MCNC: 5.6 NG/ML (ref 1.1–4.4)
CA-I SERPL ISE-MCNC: 5.2 MG/DL (ref 4.5–5.6)
CALCIUM SERPL-MCNC: 9.6 MG/DL (ref 8.6–10.5)
CHLORIDE SERPL-SCNC: 102 MMOL/L (ref 98–107)
CHOLEST SERPL-MCNC: 168 MG/DL (ref 0–200)
CO2 SERPL-SCNC: 21.4 MMOL/L (ref 22–29)
CORTIS SERPL-MCNC: 9.2 UG/DL
CREAT SERPL-MCNC: 0.81 MG/DL (ref 0.57–1)
DOPAMINE SERPL-MCNC: <30 PG/ML (ref 0–48)
EPINEPH PLAS-MCNC: 37 PG/ML (ref 0–62)
FSH SERPL-ACNC: 3.5 MIU/ML
GFR SERPLBLD CREATININE-BSD FMLA CKD-EPI: 80 ML/MIN/1.73
GFR SERPLBLD CREATININE-BSD FMLA CKD-EPI: 97 ML/MIN/1.73
GH SERPL-MCNC: 0.1 NG/ML (ref 0–10)
GLOBULIN SER CALC-MCNC: 3.1 GM/DL
GLUCOSE SERPL-MCNC: 103 MG/DL (ref 65–99)
HBA1C MFR BLD: 5.38 % (ref 4.8–5.6)
HDLC SERPL-MCNC: 48 MG/DL (ref 40–60)
IGF-I SERPL-MCNC: 127 NG/ML (ref 69–227)
LDLC SERPL CALC-MCNC: 101 MG/DL (ref 0–100)
LH SERPL-ACNC: 3 MIU/ML
NOREPINEPH PLAS-MCNC: 714 PG/ML (ref 0–874)
PHOSPHATE SERPL-MCNC: 3.5 MG/DL (ref 2.5–4.5)
POTASSIUM SERPL-SCNC: 4.4 MMOL/L (ref 3.5–5.2)
PROLACTIN SERPL-MCNC: 12.5 NG/ML (ref 4.8–23.3)
PROT SERPL-MCNC: 7.9 G/DL (ref 6–8.5)
PTH-INTACT SERPL-MCNC: 28 PG/ML (ref 15–65)
SODIUM SERPL-SCNC: 140 MMOL/L (ref 136–145)
T3FREE SERPL-MCNC: 3.6 PG/ML (ref 2–4.4)
T4 FREE SERPL-MCNC: 0.97 NG/DL (ref 0.93–1.7)
T4 SERPL-MCNC: 7.18 MCG/DL (ref 4.5–11.7)
THYROGLOB AB SERPL-ACNC: <1 IU/ML (ref 0–0.9)
THYROGLOB SERPL-MCNC: 8.2 NG/ML (ref 1.5–38.5)
TRIGL SERPL-MCNC: 96 MG/DL (ref 0–150)
TSH SERPL DL<=0.005 MIU/L-ACNC: 1.6 MIU/ML (ref 0.27–4.2)
URATE SERPL-MCNC: 6.1 MG/DL (ref 2.4–5.7)
VLDLC SERPL CALC-MCNC: 19.2 MG/DL (ref 5–40)

## 2017-11-22 RX ORDER — ERGOCALCIFEROL 1.25 MG/1
50000 CAPSULE ORAL 3 TIMES WEEKLY
Qty: 39 CAPSULE | Refills: 3 | Status: SHIPPED | OUTPATIENT
Start: 2017-11-22 | End: 2017-11-27 | Stop reason: SDUPTHER

## 2017-11-25 ENCOUNTER — RESULTS ENCOUNTER (OUTPATIENT)
Dept: INTERNAL MEDICINE | Facility: CLINIC | Age: 37
End: 2017-11-25

## 2017-11-25 DIAGNOSIS — R74.8 ELEVATED LIVER ENZYMES: ICD-10-CM

## 2017-11-27 RX ORDER — ERGOCALCIFEROL 1.25 MG/1
50000 CAPSULE ORAL 3 TIMES WEEKLY
Qty: 36 CAPSULE | Refills: 3 | Status: SHIPPED | OUTPATIENT
Start: 2017-11-27 | End: 2018-02-21 | Stop reason: SDUPTHER

## 2017-12-06 LAB
ACTIN IGG SERPL-ACNC: 6 UNITS (ref 0–19)
ALBUMIN SERPL-MCNC: 4.8 G/DL (ref 3.5–5.2)
ALP SERPL-CCNC: 53 U/L (ref 39–117)
ALT SERPL-CCNC: 89 U/L (ref 1–33)
AST SERPL-CCNC: 44 U/L (ref 1–32)
BILIRUB DIRECT SERPL-MCNC: <0.2 MG/DL (ref 0–0.3)
BILIRUB SERPL-MCNC: 0.3 MG/DL (ref 0.1–1.2)
LKM-1 AB SER-ACNC: <1 UNITS (ref 0–20)
PROT SERPL-MCNC: 7.9 G/DL (ref 6–8.5)

## 2017-12-12 ENCOUNTER — TELEPHONE (OUTPATIENT)
Dept: ENDOCRINOLOGY | Age: 37
End: 2017-12-12

## 2017-12-12 DIAGNOSIS — R74.8 ELEVATED LIVER ENZYMES: Primary | ICD-10-CM

## 2017-12-12 NOTE — TELEPHONE ENCOUNTER
----- Message from Aramis Martínez MD sent at 12/7/2017  5:01 PM EST -----  Tell her to go ahead and go to a big grocery store and purchase vitamin D3 5000 unit strong and take 2 of those daily.  ----- Message -----     From: Donna Zavala MA     Sent: 12/6/2017   2:50 PM       To: Aramis Martínez MD    Patient called stating that her insurance is not covering vitamin d. I tried to do a PA but it states that a PA is not required it is just too high of a dose. Insurance will cover once a week for the vitamin d.    Spoke to patient.

## 2017-12-19 ENCOUNTER — OFFICE VISIT (OUTPATIENT)
Dept: GASTROENTEROLOGY | Facility: CLINIC | Age: 37
End: 2017-12-19

## 2017-12-19 VITALS
WEIGHT: 184 LBS | HEIGHT: 61 IN | DIASTOLIC BLOOD PRESSURE: 74 MMHG | SYSTOLIC BLOOD PRESSURE: 122 MMHG | BODY MASS INDEX: 34.74 KG/M2 | TEMPERATURE: 98.6 F

## 2017-12-19 DIAGNOSIS — R79.89 ELEVATED LIVER FUNCTION TESTS: Primary | ICD-10-CM

## 2017-12-19 PROCEDURE — 99203 OFFICE O/P NEW LOW 30 MIN: CPT | Performed by: INTERNAL MEDICINE

## 2017-12-19 RX ORDER — MELATONIN
10000 DAILY
COMMUNITY
End: 2020-02-19 | Stop reason: DRUGHIGH

## 2017-12-19 NOTE — PROGRESS NOTES
Chief Complaint   Patient presents with   • Heartburn   • ABN LFT's   • Irritable Bowel Syndrome     Subjective      HPI     Skyla Willams is a 37 y.o. female who presents for evaluation of elevated liver enzymes.  Review of labs shows this has dated back to 2015 at least.  Weight did increase dramatically about 10yrs ago and has been relatively stable since then.  No family hx of liver disorders.  She drinks EtOH rarely.  No OTC herbal supplements or vitamins.  She has had autoimmune serology done as part of workup for fatigue which has been normal.  Prior EGD in 2009 with hiatal hernia.  She has hx of spastic colon.  No pruritis or jaundice.  No change in eye color.      Past Medical History:   Diagnosis Date   • Dysautonomia    • Hiatal hernia    • Irritable bowel syndrome    • Vitamin D deficiency        Current Outpatient Prescriptions:   •  cholecalciferol (VITAMIN D3) 1000 units tablet, Take 10,000 Units by mouth Daily., Disp: , Rfl:   •  fludrocortisone 0.1 MG tablet, Take 1 tablet by mouth Daily., Disp: 30 tablet, Rfl: 3  •  traMADol (ULTRAM) 50 MG tablet, Take 50 mg by mouth., Disp: , Rfl:   •  ergocalciferol (ERGOCALCIFEROL) 14471 units capsule, Take 1 capsule by mouth 3 (Three) Times a Week., Disp: 36 capsule, Rfl: 3     Allergies   Allergen Reactions   • Cymbalta [Duloxetine Hcl]    • Penicillins Nausea Only     Social History     Social History   • Marital status:      Spouse name: N/A   • Number of children: N/A   • Years of education: N/A     Occupational History   • Not on file.     Social History Main Topics   • Smoking status: Never Smoker   • Smokeless tobacco: Never Used   • Alcohol use Yes      Comment: seldom   • Drug use: No   • Sexual activity: Not on file     Other Topics Concern   • Not on file     Social History Narrative     Family History   Problem Relation Age of Onset   • Depression Mother    • Arthritis Father    • Depression Father    • Depression Sister    • Myasthenia gravis  Sister    • Stroke Maternal Uncle    • Alcohol abuse Maternal Uncle    • Cancer Paternal Aunt    • Thyroid disease Paternal Aunt    • Depression Paternal Aunt    • Heart disease Paternal Uncle    • Cancer Paternal Uncle      colon, pancreatic   • Depression Paternal Uncle    • Colon cancer Paternal Uncle    • Hypertension Maternal Grandmother    • Depression Maternal Grandmother    • Heart disease Maternal Grandfather    • Hypertension Maternal Grandfather    • Stroke Maternal Grandfather    • Heart disease Paternal Grandmother    • Hypertension Paternal Grandmother    • Cancer Paternal Grandmother      liver,bone   • Thyroid disease Paternal Grandmother    • Arthritis Paternal Grandmother    • Depression Paternal Grandmother    • Cancer Paternal Grandfather      lung   • Lung disease Paternal Grandfather    • Pancreatic cancer Paternal Grandfather      Review of Systems   Constitutional: Positive for fatigue and unexpected weight change.   Gastrointestinal:        GERD  Colon spasm     All other systems reviewed and are negative.       Objective   Vitals:    12/19/17 1437   BP: 122/74   Temp: 98.6 °F (37 °C)     Physical Exam   Constitutional: She is oriented to person, place, and time. She appears well-developed and well-nourished.   HENT:   Head: Normocephalic and atraumatic.   Mouth/Throat: Oropharynx is clear and moist.   Eyes: EOM are normal. No scleral icterus.   Neck: Normal range of motion. Neck supple. No thyromegaly present.   Cardiovascular: Normal rate, regular rhythm and normal heart sounds.  Exam reveals no gallop and no friction rub.    No murmur heard.  Pulmonary/Chest: Effort normal and breath sounds normal. She has no wheezes. She has no rales. She exhibits no tenderness.   Abdominal: Soft. Bowel sounds are normal. She exhibits no distension. There is no tenderness. There is no rebound and no guarding. No hernia.   Obese     Musculoskeletal: Normal range of motion. She exhibits no edema.    Lymphadenopathy:     She has no cervical adenopathy.   Neurological: She is alert and oriented to person, place, and time.   Skin: Skin is warm and dry.   Psychiatric: She has a normal mood and affect. Judgment and thought content normal.   Vitals reviewed.       Assessment/Plan   Assessment:     1. Elevated liver function tests    2.      Spastic colon    Plan:   Check viral hepatitis panel, AMA, celiac panel, iron profile today  Referral for u/s of liver    I suspect her relatively low-grade transaminitis is a side effect of underlying fatty liver disease.  I counseled her on the importance of weight reduction through dietary modification and exercise as a treatment of choice for this condition.  She will f/u in 3 mos for reassessment.  We will consider a BRENNER fibrosure at that time.          Zhao Sim M.D.  [facility]  [address] [place] [address]  Office: [phone]

## 2017-12-20 LAB
A1AT SERPL-MCNC: 147 MG/DL (ref 90–200)
CERULOPLASMIN SERPL-MCNC: 35.3 MG/DL (ref 19–39)
ENDOMYSIUM IGA SER QL: NEGATIVE
FERRITIN SERPL-MCNC: 111.7 NG/ML (ref 13–150)
GLIADIN PEPTIDE IGA SER-ACNC: 4 UNITS (ref 0–19)
GLIADIN PEPTIDE IGG SER-ACNC: 4 UNITS (ref 0–19)
HAV IGM SERPL QL IA: NEGATIVE
HBV CORE IGM SERPL QL IA: NEGATIVE
HBV SURFACE AG SERPL QL IA: NEGATIVE
HCV AB S/CO SERPL IA: <0.1 S/CO RATIO (ref 0–0.9)
IGA SERPL-MCNC: 153 MG/DL (ref 87–352)
IGG SERPL-MCNC: 1241 MG/DL (ref 700–1600)
IRON SATN MFR SERPL: 11 % (ref 20–50)
IRON SERPL-MCNC: 40 MCG/DL (ref 37–145)
MITOCHONDRIA M2 IGG SER-ACNC: <20 UNITS (ref 0–20)
TIBC SERPL-MCNC: 371 MCG/DL
TTG IGA SER-ACNC: <2 U/ML (ref 0–3)
TTG IGG SER-ACNC: <2 U/ML (ref 0–5)
UIBC SERPL-MCNC: 331 MCG/DL

## 2017-12-31 ENCOUNTER — HOSPITAL ENCOUNTER (OUTPATIENT)
Dept: ULTRASOUND IMAGING | Facility: HOSPITAL | Age: 37
Discharge: HOME | End: 2017-12-31
Attending: INTERNAL MEDICINE | Admitting: INTERNAL MEDICINE

## 2017-12-31 DIAGNOSIS — R79.89 ELEVATED LIVER FUNCTION TESTS: ICD-10-CM

## 2017-12-31 PROCEDURE — 76705 ECHO EXAM OF ABDOMEN: CPT

## 2018-01-03 ENCOUNTER — OFFICE VISIT (OUTPATIENT)
Dept: INTERNAL MEDICINE | Facility: CLINIC | Age: 38
End: 2018-01-03

## 2018-01-03 VITALS
SYSTOLIC BLOOD PRESSURE: 133 MMHG | HEART RATE: 123 BPM | OXYGEN SATURATION: 97 % | HEIGHT: 61 IN | WEIGHT: 185.6 LBS | BODY MASS INDEX: 35.04 KG/M2 | DIASTOLIC BLOOD PRESSURE: 70 MMHG | TEMPERATURE: 98.8 F

## 2018-01-03 DIAGNOSIS — G90.1 DYSAUTONOMIA (HCC): ICD-10-CM

## 2018-01-03 DIAGNOSIS — G89.4 CHRONIC PAIN SYNDROME: ICD-10-CM

## 2018-01-03 DIAGNOSIS — E66.09 EXOGENOUS OBESITY: ICD-10-CM

## 2018-01-03 DIAGNOSIS — G47.419 PRIMARY NARCOLEPSY WITHOUT CATAPLEXY: ICD-10-CM

## 2018-01-03 DIAGNOSIS — M79.7 FIBROMYALGIA: Primary | ICD-10-CM

## 2018-01-03 PROBLEM — K75.81 NASH (NONALCOHOLIC STEATOHEPATITIS): Status: ACTIVE | Noted: 2018-01-03

## 2018-01-03 PROCEDURE — 99214 OFFICE O/P EST MOD 30 MIN: CPT | Performed by: INTERNAL MEDICINE

## 2018-01-03 RX ORDER — GABAPENTIN 300 MG/1
CAPSULE ORAL
Qty: 180 CAPSULE | Refills: 1 | Status: SHIPPED | OUTPATIENT
Start: 2018-01-03 | End: 2018-08-09 | Stop reason: SDUPTHER

## 2018-01-03 RX ORDER — AMITRIPTYLINE HYDROCHLORIDE 10 MG/1
10 TABLET, FILM COATED ORAL NIGHTLY
Qty: 90 TABLET | Refills: 3 | Status: SHIPPED | OUTPATIENT
Start: 2018-01-03 | End: 2018-09-06 | Stop reason: ALTCHOICE

## 2018-01-03 RX ORDER — DEXTROAMPHETAMINE SACCHARATE, AMPHETAMINE ASPARTATE, DEXTROAMPHETAMINE SULFATE AND AMPHETAMINE SULFATE 2.5; 2.5; 2.5; 2.5 MG/1; MG/1; MG/1; MG/1
10 TABLET ORAL DAILY
Qty: 30 TABLET | Refills: 0 | Status: SHIPPED | OUTPATIENT
Start: 2018-01-03 | End: 2018-01-30 | Stop reason: SDUPTHER

## 2018-01-10 ENCOUNTER — TELEPHONE (OUTPATIENT)
Dept: GASTROENTEROLOGY | Facility: CLINIC | Age: 38
End: 2018-01-10

## 2018-01-10 DIAGNOSIS — R74.8 ELEVATED LIVER ENZYMES: Primary | ICD-10-CM

## 2018-01-10 NOTE — TELEPHONE ENCOUNTER
----- Message from Zhao Sim MD sent at 1/10/2018  4:15 PM EST -----  Fatty liver  Diet and exercise as discussed in office  O/V in 3 mos with CMp 1 week prior

## 2018-01-16 NOTE — PROGRESS NOTES
Subjective   Skyla Willams is a 37 y.o. female.   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    Review of Systems   Musculoskeletal: Positive for arthralgias and myalgias.   Psychiatric/Behavioral: Positive for sleep disturbance (narcolepsy during the day).   All other systems reviewed and are negative.      Objective   Physical Exam   Constitutional: She is oriented to person, place, and time. She appears well-developed and well-nourished. She is cooperative.   HENT:   Head: Normocephalic and atraumatic.   Right Ear: Hearing, tympanic membrane, external ear and ear canal normal.   Left Ear: Hearing, tympanic membrane, external ear and ear canal normal.   Nose: Nose normal.   Mouth/Throat: Uvula is midline, oropharynx is clear and moist and mucous membranes are normal.   Eyes: Conjunctivae, EOM and lids are normal. Pupils are equal, round, and reactive to light.   Neck: Phonation normal. Neck supple. Carotid bruit is not present.   Cardiovascular: Regular rhythm and normal heart sounds.  Tachycardia present.  Exam reveals no gallop and no friction rub.    No murmur heard.  She has tachycardia from dysautonomia and this comes down very quickly  when she calms down   Pulmonary/Chest: Effort normal and breath sounds normal. No respiratory distress.   Abdominal: Soft. Bowel sounds are normal. She exhibits no distension and no mass. There is no hepatosplenomegaly. There is no tenderness. There is no rebound and no guarding. No hernia.   Musculoskeletal: She exhibits tenderness (multiple muscle groups and joints). She exhibits no edema.   Neurological: She is alert and oriented to person, place, and time. Coordination and gait normal.   Skin: Skin is warm and dry.   Psychiatric: She has a normal mood and affect. Her speech is normal and behavior is normal. Judgment and thought content normal.   Nursing note and vitals reviewed.      Assessment/Plan   Diagnoses and all orders for this visit:    Fibromyalgia    Chronic pain syndrome    Primary narcolepsy without cataplexy  -     Ambulatory Referral to Sleep Medicine    Dysautonomia    Exogenous obesity    Other orders  -     amphetamine-dextroamphetamine (ADDERALL) 10 MG tablet; Take 1 tablet by mouth Daily.  -     gabapentin (NEURONTIN) 300 MG capsule; 1 capsule twice a day  -     amitriptyline (ELAVIL) 10 MG tablet; Take 1 tablet by mouth Every Night.      Fibromyalgia supportive meds including Neurontin and Elavil  Chronic pain syndrome supportive meds for this along with what is mentioned and massages  Narcolepsy without cataplexy we will refer to sleep medicine  Dysautonomia noted and she has tachycardia from this also known as pots syndrome  Exogenous obesity getting worse not better and this needs to be change with discussion about weight loss with proper diet which she cannot tolerate much exercise at this time

## 2018-01-26 NOTE — TELEPHONE ENCOUNTER
Pt already has f/u appt scheduled for 3/20/18 at 1 PM.     Called pt and advised that per Dr Sim: all her labwork looked OK and her liver U/S showed a fatty liver but was otherwise OK. Advised that MD recommends a low fat diet and exercise as discussed in the office. Advised she should f/u as scheduled on 3/20/18 at 1PM and MD would like for her to come in for labwork the week before. Pt verb understanding. Lab appt scheduled for 3/14 at 9 AM.   Lab order placed.

## 2018-01-31 DIAGNOSIS — R53.82 CHRONIC FATIGUE: ICD-10-CM

## 2018-01-31 DIAGNOSIS — R73.09 ELEVATED GLUCOSE: ICD-10-CM

## 2018-01-31 DIAGNOSIS — E55.9 VITAMIN D DEFICIENCY: Primary | ICD-10-CM

## 2018-01-31 RX ORDER — DEXTROAMPHETAMINE SACCHARATE, AMPHETAMINE ASPARTATE, DEXTROAMPHETAMINE SULFATE AND AMPHETAMINE SULFATE 2.5; 2.5; 2.5; 2.5 MG/1; MG/1; MG/1; MG/1
10 TABLET ORAL DAILY
Qty: 30 TABLET | Refills: 0 | Status: SHIPPED | OUTPATIENT
Start: 2018-01-31 | End: 2018-03-08 | Stop reason: SDUPTHER

## 2018-02-05 ENCOUNTER — OFFICE VISIT (OUTPATIENT)
Dept: SLEEP MEDICINE | Facility: HOSPITAL | Age: 38
End: 2018-02-05

## 2018-02-05 VITALS
DIASTOLIC BLOOD PRESSURE: 76 MMHG | WEIGHT: 179 LBS | HEART RATE: 105 BPM | BODY MASS INDEX: 33.79 KG/M2 | SYSTOLIC BLOOD PRESSURE: 103 MMHG | HEIGHT: 61 IN

## 2018-02-05 DIAGNOSIS — F51.3 SLEEP WALKING: ICD-10-CM

## 2018-02-05 DIAGNOSIS — E66.9 OBESITY (BMI 30-39.9): ICD-10-CM

## 2018-02-05 DIAGNOSIS — G47.10 HYPERSOMNIA: Primary | ICD-10-CM

## 2018-02-05 PROCEDURE — G0463 HOSPITAL OUTPT CLINIC VISIT: HCPCS

## 2018-02-05 NOTE — PROGRESS NOTES
[facility]  Telephone: [phone] / Fax: [phone] [place]  Telephone: [phone] / Fax: [phone] Kristie Nguyen    Referring Physician: Arnold Martinez MD  PCP: Arnold Martinez MD    Reason for consult:  sleep apnea    Skyla Willams was seen in the [facility] Disorders Center today for evaluation of sleep apnea.  She feels exhausted all the time and has been feeling like this for the past 4 years. She was diagnosed with chronic fatigue and narcolepsy 4 years ago, though she never had a formal PSG+MSLT. In August 2017 she stopped Adderall as she could not get in refilled. Discontinuation of Adderall resulted in profound sleepiness recurrence and she had to reduce her work hours to 16-20hr per week. She resumed the Adderall second week of January 2018 and noticed improvement in EDS very quickly. However, around 3pm she feels sleepy again.  She gained 40 pounds 10 years ago and is trying to work on weight loss. She has tachycardia and syncopal episodes. She takes Fludrocortisone.     She reports ongoing for the past 10 years. No prior KATHIA evaluation has been performed. She has sleep walking and talking. She has a lot of dreams. She denies prior head injury. She denies sleep paralysis. No cataplexy.  No hallucinations when she falls asleep or wakes up from sleep.    She goes to bed between 8-10pm. It takes her 15 min to fall asleep. She wakes up at 7:30am. She snores at night. No gasping for breath or choking. She grinds her teeth and has recurrent GERD at night.  She has HH.     Skyla Willams  has a past medical history of Dysautonomia; Hiatal hernia; Irritable bowel syndrome; and Vitamin D deficiency.    Current Medications:    Current Outpatient Prescriptions:   •  amitriptyline (ELAVIL) 10 MG tablet, Take 1 tablet by mouth Every Night., Disp: 90 tablet, Rfl: 3  •  amphetamine-dextroamphetamine (ADDERALL) 10 MG tablet, Take 1 tablet by mouth Daily., Disp: 30 tablet, Rfl: 0  •   "cholecalciferol (VITAMIN D3) 1000 units tablet, Take 10,000 Units by mouth Daily., Disp: , Rfl:   •  ergocalciferol (ERGOCALCIFEROL) 39756 units capsule, Take 1 capsule by mouth 3 (Three) Times a Week., Disp: 36 capsule, Rfl: 3  •  fludrocortisone 0.1 MG tablet, Take 1 tablet by mouth Daily., Disp: 30 tablet, Rfl: 3  •  gabapentin (NEURONTIN) 300 MG capsule, 1 capsule twice a day, Disp: 180 capsule, Rfl: 1  •  traMADol (ULTRAM) 50 MG tablet, Take 50 mg by mouth., Disp: , Rfl:     I have reviewed Past Medical History, Past Surgical History, Medication List, Social History and Family History as entered in Sleep Questionnaire and EPIC.    ESS  11   Vital Signs Vitals:    02/05/18 1317   BP: 103/76   BP Location: Left arm   Patient Position: Sitting   Pulse: 105   Weight: 81.2 kg (179 lb)   Height: 154.9 cm (61\")    Body mass index is 33.82 kg/(m^2).    General Alert and oriented. No acute distress noted   Pharynx/Throat Class IV Mallampati airway, large tongue, no evidence of redundant lateral pharyngeal tissue. No oral lesions. No thrush. Moist mucous membranes..   Head Normocephalic. Symmetrical. Atraumatic.    Nose No septal deviation. No drainage   Chest Wall Normal shape. Symmetric expansion with respiration. No tenderness.   Neck Trachea midline, no thyromegaly or adenopathy    Lungs Clear to auscultation bilaterally. No wheezes. No rhonchi. No rales. Respirations regular, even and unlabored.   Heart Regular rhythm and normal rate. Normal S1 and S2. No murmur   Abdomen Soft, non-tender and non-distended. Normal bowel sounds. No masses.   Extremities Moves all extremities well. No edema   Psychiatric Normal mood and affect.                Impression:  1. Hypersomnia    2. Obesity (BMI 30-39.9)    3. Sleep walking          Plan:  HST will be done first to exclude sleep disorder breathing as contributing factor for hypersomnia. If HST is negative,  PSG+MSLT will be required.  UDS will need to be obtained.Consider " taper off Adderall.    I discussed the pathophysiology of obstructive sleep apnea with the patient.  We discussed the adverse outcomes associated with untreated sleep-disordered breathing.  We discussed treatment modalities of obstructive sleep apnea including CPAP device as well as oral mandibular advancement device. Sleep study will be scheduled to establish definitive diagnosis of sleep disorder breathing.  Weight loss will be strongly beneficial in order to reduce the severity of sleep-disordered breathing.  Patient has narrow oropharyngeal structure.  Caution during activities that require prolonged concentration is strongly advised.  Patient will be notified of sleep study results after sleep study is completed.  If sleep apnea is only mild,  oral mandibular advancement device may be one of the treatment options.  However if sleep apnea is moderately severe, CPAP treatment will be strongly encouraged.  The patient is not opposed to treatment with CPAP device if we confirm significant obstructive sleep apnea on polysomnography    Thank you for allowing me to participate in your patient's care.    The patient will follow up with Dr. Chambers after completion of polysomnography.    SERENA Whitley  [facility]  Phone: [phone]      Part of this note may be an electronic transcription/translation of spoken language to printed text using the Dragon Dictation System. Some errors may exist even though the document was edited.

## 2018-02-07 ENCOUNTER — LAB (OUTPATIENT)
Dept: ENDOCRINOLOGY | Age: 38
End: 2018-02-07

## 2018-02-07 DIAGNOSIS — E55.9 VITAMIN D DEFICIENCY: ICD-10-CM

## 2018-02-07 DIAGNOSIS — R73.09 ELEVATED GLUCOSE: ICD-10-CM

## 2018-02-07 DIAGNOSIS — R53.82 CHRONIC FATIGUE: ICD-10-CM

## 2018-02-10 LAB
25(OH)D3+25(OH)D2 SERPL-MCNC: 58 NG/ML (ref 30–100)
ALBUMIN SERPL-MCNC: 5 G/DL (ref 3.5–5.2)
ALBUMIN/GLOB SERPL: 1.9 G/DL
ALP SERPL-CCNC: 47 U/L (ref 39–117)
ALT SERPL-CCNC: 73 U/L (ref 1–33)
AST SERPL-CCNC: 39 U/L (ref 1–32)
BILIRUB SERPL-MCNC: 0.3 MG/DL (ref 0.1–1.2)
BUN SERPL-MCNC: 8 MG/DL (ref 6–20)
BUN/CREAT SERPL: 10.4 (ref 7–25)
C PEPTIDE SERPL-MCNC: 7.2 NG/ML (ref 1.1–4.4)
CALCIUM SERPL-MCNC: 9.5 MG/DL (ref 8.6–10.5)
CHLORIDE SERPL-SCNC: 99 MMOL/L (ref 98–107)
CHOLEST SERPL-MCNC: 167 MG/DL (ref 0–200)
CO2 SERPL-SCNC: 26.7 MMOL/L (ref 22–29)
CREAT SERPL-MCNC: 0.77 MG/DL (ref 0.57–1)
GFR SERPLBLD CREATININE-BSD FMLA CKD-EPI: 103 ML/MIN/1.73
GFR SERPLBLD CREATININE-BSD FMLA CKD-EPI: 85 ML/MIN/1.73
GLOBULIN SER CALC-MCNC: 2.7 GM/DL
GLUCOSE SERPL-MCNC: 100 MG/DL (ref 65–99)
HBA1C MFR BLD: 5.2 % (ref 4.8–5.6)
HDLC SERPL-MCNC: 42 MG/DL (ref 40–60)
INTERPRETATION: NORMAL
LDLC SERPL CALC-MCNC: 100 MG/DL (ref 0–100)
POTASSIUM SERPL-SCNC: 4.1 MMOL/L (ref 3.5–5.2)
PROT SERPL-MCNC: 7.7 G/DL (ref 6–8.5)
SODIUM SERPL-SCNC: 139 MMOL/L (ref 136–145)
T3FREE SERPL-MCNC: 3 PG/ML (ref 2–4.4)
T4 FREE SERPL-MCNC: 0.96 NG/DL (ref 0.93–1.7)
T4 SERPL-MCNC: 7.19 MCG/DL (ref 4.5–11.7)
THYROGLOB AB SERPL-ACNC: <1 IU/ML
THYROGLOB SERPL-MCNC: 6.6 NG/ML
THYROGLOB SERPL-MCNC: NORMAL NG/ML
TRIGL SERPL-MCNC: 123 MG/DL (ref 0–150)
TSH SERPL DL<=0.005 MIU/L-ACNC: 1.65 MIU/ML (ref 0.27–4.2)
URATE SERPL-MCNC: 6.6 MG/DL (ref 2.4–5.7)
VLDLC SERPL CALC-MCNC: 24.6 MG/DL (ref 5–40)

## 2018-02-21 ENCOUNTER — OFFICE VISIT (OUTPATIENT)
Dept: ENDOCRINOLOGY | Age: 38
End: 2018-02-21

## 2018-02-21 ENCOUNTER — HOSPITAL ENCOUNTER (OUTPATIENT)
Dept: SLEEP MEDICINE | Facility: HOSPITAL | Age: 38
Discharge: HOME | End: 2018-02-21
Admitting: NURSE PRACTITIONER

## 2018-02-21 VITALS
BODY MASS INDEX: 33.68 KG/M2 | RESPIRATION RATE: 16 BRPM | HEIGHT: 61 IN | WEIGHT: 178.4 LBS | SYSTOLIC BLOOD PRESSURE: 118 MMHG | DIASTOLIC BLOOD PRESSURE: 72 MMHG

## 2018-02-21 DIAGNOSIS — K75.81 NASH (NONALCOHOLIC STEATOHEPATITIS): ICD-10-CM

## 2018-02-21 DIAGNOSIS — E55.9 VITAMIN D DEFICIENCY: Primary | ICD-10-CM

## 2018-02-21 DIAGNOSIS — G47.10 HYPERSOMNIA: ICD-10-CM

## 2018-02-21 DIAGNOSIS — E88.81 INSULIN RESISTANCE SYNDROME: ICD-10-CM

## 2018-02-21 DIAGNOSIS — E79.0 HYPERURICEMIA: ICD-10-CM

## 2018-02-21 PROBLEM — E88.810 INSULIN RESISTANCE SYNDROME: Status: ACTIVE | Noted: 2018-02-21

## 2018-02-21 PROCEDURE — 95806 SLEEP STUDY UNATT&RESP EFFT: CPT

## 2018-02-21 PROCEDURE — 99214 OFFICE O/P EST MOD 30 MIN: CPT | Performed by: INTERNAL MEDICINE

## 2018-02-21 RX ORDER — ERGOCALCIFEROL 1.25 MG/1
50000 CAPSULE ORAL 3 TIMES WEEKLY
Qty: 39 CAPSULE | Refills: 3 | Status: SHIPPED | OUTPATIENT
Start: 2018-02-21 | End: 2018-02-26 | Stop reason: SDUPTHER

## 2018-02-21 RX ORDER — ALLOPURINOL 100 MG/1
100 TABLET ORAL DAILY
Qty: 30 TABLET | Refills: 5 | Status: SHIPPED | OUTPATIENT
Start: 2018-02-21 | End: 2018-09-06 | Stop reason: SDUPTHER

## 2018-02-21 RX ORDER — PIOGLITAZONEHYDROCHLORIDE 30 MG/1
30 TABLET ORAL DAILY
Qty: 30 TABLET | Refills: 11 | Status: SHIPPED | OUTPATIENT
Start: 2018-02-21 | End: 2018-09-06

## 2018-02-21 NOTE — PROGRESS NOTES
"Subjective   Skyla Willams is a 37 y.o. female seen for follow up for fatigue, vit d deficiency, elevated glucose, lab review. Patient denies any problems or concerns. She has a sleep study scheduled for tonight. She restarted gabapentin and adderall and most of her symptoms cleared up again. She was seen by a gastroenterologist and was diagnosed with fatty liver.      History of Present Illness this is a 37-year-old female known patient with chronic fatigue and vitamin D deficiency and fasting glucose intolerance as well as hyperuricemia and fatty liver.  Her chronic fatigue and fibromyalgia seems to have responded to the combination of Adderall as well as gabapentin.  Over the course of last 6 months she has had no significant health problems for which to go to the emergency room or hospital.    /72  Resp 16  Ht 154.9 cm (61\")  Wt 80.9 kg (178 lb 6.4 oz)  BMI 33.71 kg/m2     Allergies   Allergen Reactions   • Cymbalta [Duloxetine Hcl]    • Penicillins Nausea Only       Current Outpatient Prescriptions:   •  amitriptyline (ELAVIL) 10 MG tablet, Take 1 tablet by mouth Every Night., Disp: 90 tablet, Rfl: 3  •  amphetamine-dextroamphetamine (ADDERALL) 10 MG tablet, Take 1 tablet by mouth Daily., Disp: 30 tablet, Rfl: 0  •  cholecalciferol (VITAMIN D3) 1000 units tablet, Take 10,000 Units by mouth Daily., Disp: , Rfl:   •  ergocalciferol (ERGOCALCIFEROL) 89387 units capsule, Take 1 capsule by mouth 3 (Three) Times a Week., Disp: 36 capsule, Rfl: 3  •  fludrocortisone 0.1 MG tablet, Take 1 tablet by mouth Daily., Disp: 30 tablet, Rfl: 3  •  gabapentin (NEURONTIN) 300 MG capsule, 1 capsule twice a day, Disp: 180 capsule, Rfl: 1  •  traMADol (ULTRAM) 50 MG tablet, Take 50 mg by mouth., Disp: , Rfl:       The following portions of the patient's history were reviewed and updated as appropriate: allergies, current medications, past family history, past medical history, past social history, past surgical history and " problem list.    Review of Systems   Constitutional: Negative.    HENT: Negative.    Eyes: Negative.    Respiratory: Negative.    Cardiovascular: Negative.    Gastrointestinal: Negative.    Endocrine: Negative.    Genitourinary: Negative.    Musculoskeletal: Negative.    Skin: Negative.    Allergic/Immunologic: Negative.    Neurological: Negative.    Hematological: Negative.    Psychiatric/Behavioral: Negative.        Objective   Physical Exam   Constitutional: She is oriented to person, place, and time. She appears well-developed and well-nourished. No distress.   HENT:   Head: Normocephalic and atraumatic.   Right Ear: External ear normal.   Left Ear: External ear normal.   Nose: Nose normal.   Mouth/Throat: Oropharynx is clear and moist. No oropharyngeal exudate.   Eyes: Conjunctivae and EOM are normal. Pupils are equal, round, and reactive to light. Right eye exhibits no discharge. Left eye exhibits no discharge. No scleral icterus.   Neck: Normal range of motion. Neck supple. No JVD present. No tracheal deviation present. No thyromegaly present.   Cardiovascular: Normal rate, regular rhythm, normal heart sounds and intact distal pulses.  Exam reveals no gallop and no friction rub.    No murmur heard.  Pulmonary/Chest: Effort normal and breath sounds normal. No stridor. No respiratory distress. She has no wheezes. She has no rales. She exhibits no tenderness.   Abdominal: Soft. Bowel sounds are normal. She exhibits no distension and no mass. There is no tenderness. There is no rebound and no guarding. No hernia.   Musculoskeletal: Normal range of motion. She exhibits no edema, tenderness or deformity.   Lymphadenopathy:     She has no cervical adenopathy.   Neurological: She is alert and oriented to person, place, and time. She has normal reflexes. She displays normal reflexes. No cranial nerve deficit. She exhibits normal muscle tone. Coordination normal.   Skin: Skin is warm and dry. No rash noted. She is not  diaphoretic. No erythema. No pallor.   Light hirsutism on her chin as well as light acanthosis nigricans around her neck.   Psychiatric: She has a normal mood and affect. Her behavior is normal. Judgment and thought content normal.   Nursing note and vitals reviewed.    Results for orders placed or performed in visit on 02/07/18   Comprehensive Metabolic Panel   Result Value Ref Range    Glucose 100 (H) 65 - 99 mg/dL    BUN 8 6 - 20 mg/dL    Creatinine 0.77 0.57 - 1.00 mg/dL    eGFR Non African Am 85 >60 mL/min/1.73    eGFR African Am 103 >60 mL/min/1.73    BUN/Creatinine Ratio 10.4 7.0 - 25.0    Sodium 139 136 - 145 mmol/L    Potassium 4.1 3.5 - 5.2 mmol/L    Chloride 99 98 - 107 mmol/L    Total CO2 26.7 22.0 - 29.0 mmol/L    Calcium 9.5 8.6 - 10.5 mg/dL    Total Protein 7.7 6.0 - 8.5 g/dL    Albumin 5.00 3.50 - 5.20 g/dL    Globulin 2.7 gm/dL    A/G Ratio 1.9 g/dL    Total Bilirubin 0.3 0.1 - 1.2 mg/dL    Alkaline Phosphatase 47 39 - 117 U/L    AST (SGOT) 39 (H) 1 - 32 U/L    ALT (SGPT) 73 (H) 1 - 33 U/L   Comprehensive Thyroglobulin   Result Value Ref Range    Thyroglobulin Ab <1.0 IU/mL    Thyroglobulin 6.6 ng/mL    Thyroglobulin (TG-TREV) Comment ng/mL   C-Peptide   Result Value Ref Range    C-Peptide 7.2 (H) 1.1 - 4.4 ng/mL   Hemoglobin A1c   Result Value Ref Range    Hemoglobin A1C 5.20 4.80 - 5.60 %   Lipid Panel   Result Value Ref Range    Total Cholesterol 167 0 - 200 mg/dL    Triglycerides 123 0 - 150 mg/dL    HDL Cholesterol 42 40 - 60 mg/dL    VLDL Cholesterol 24.6 5 - 40 mg/dL    LDL Cholesterol  100 0 - 100 mg/dL   T3, Free   Result Value Ref Range    T3, Free 3.0 2.0 - 4.4 pg/mL   T4 & TSH ([facility])   Result Value Ref Range    TSH 1.650 0.270 - 4.200 mIU/mL    T4, Total 7.19 4.50 - 11.70 mcg/dL   T4, Free   Result Value Ref Range    Free T4 0.96 0.93 - 1.70 ng/dL   Uric Acid   Result Value Ref Range    Uric Acid 6.6 (H) 2.4 - 5.7 mg/dL   Vitamin D 25 Hydroxy   Result Value Ref Range    25 Hydroxy,  Vitamin D 58.0 30.0 - 100.0 ng/mL   Cardiovascular Risk Assessment   Result Value Ref Range    Interpretation Note          Assessment/Plan   Diagnoses and all orders for this visit:    Vitamin D deficiency  -     T3, Free; Future  -     T4 & TSH ([facility]); Future  -     T4, Free; Future  -     Uric Acid; Future  -     Vitamin D 25 Hydroxy; Future  -     Comprehensive Metabolic Panel; Future  -     C-Peptide; Future  -     Hemoglobin A1c; Future  -     Lipid Panel; Future    BRENNER (nonalcoholic steatohepatitis)  -     T3, Free; Future  -     T4 & TSH ([facility]); Future  -     T4, Free; Future  -     Uric Acid; Future  -     Vitamin D 25 Hydroxy; Future  -     Comprehensive Metabolic Panel; Future  -     C-Peptide; Future  -     Hemoglobin A1c; Future  -     Lipid Panel; Future    Hyperuricemia  -     T3, Free; Future  -     T4 & TSH ([facility]); Future  -     T4, Free; Future  -     Uric Acid; Future  -     Vitamin D 25 Hydroxy; Future  -     Comprehensive Metabolic Panel; Future  -     C-Peptide; Future  -     Hemoglobin A1c; Future  -     Lipid Panel; Future    Insulin resistance syndrome  -     T3, Free; Future  -     T4 & TSH ([facility]); Future  -     T4, Free; Future  -     Uric Acid; Future  -     Vitamin D 25 Hydroxy; Future  -     Comprehensive Metabolic Panel; Future  -     C-Peptide; Future  -     Hemoglobin A1c; Future  -     Lipid Panel; Future    Other orders  -     allopurinol (ZYLOPRIM) 100 MG tablet; Take 1 tablet by mouth Daily.  -     pioglitazone (ACTOS) 30 MG tablet; Take 1 tablet by mouth Daily.  -     ergocalciferol (ERGOCALCIFEROL) 51299 units capsule; Take 1 capsule by mouth 3 (Three) Times a Week.               In his summary I saw and examined this the 37-year-old female for above-mentioned problems.  I reviewed her laboratory evaluation of 02/07/2018 and provided her with a hard copy of it.  Her LFTs are elevated which is consistent with the her fatty liver.  Her uric acid is elevated at 6.6  and therefore am going to go ahead and start her on allopurinol 100 mg daily and start her on Actos 30 mg daily for her fatty liver and insulin resistance.  I will see her in 6 months or sooner if needed with laboratory evaluation prior to each office visit.

## 2018-02-22 ENCOUNTER — PRIOR AUTHORIZATION (OUTPATIENT)
Dept: ENDOCRINOLOGY | Age: 38
End: 2018-02-22

## 2018-02-22 NOTE — TELEPHONE ENCOUNTER
PT'S PA FOR VIT D WAS SUBMITTED TO [facility] ON 2/22/18 AND WAS APPROVED ON 2/26/18. PHARMACY WAS NOTIFIED.

## 2018-02-26 RX ORDER — ERGOCALCIFEROL 1.25 MG/1
50000 CAPSULE ORAL 3 TIMES WEEKLY
Qty: 39 CAPSULE | Refills: 3 | Status: SHIPPED | OUTPATIENT
Start: 2018-02-26 | End: 2018-05-01

## 2018-03-05 ENCOUNTER — TELEPHONE (OUTPATIENT)
Dept: SLEEP MEDICINE | Facility: HOSPITAL | Age: 38
End: 2018-03-05

## 2018-03-05 NOTE — TELEPHONE ENCOUNTER
Sending setup to [facility].  Pt to return call to discuss ss results and schedule f/u appt with dr Chambers.

## 2018-03-08 RX ORDER — DEXTROAMPHETAMINE SACCHARATE, AMPHETAMINE ASPARTATE, DEXTROAMPHETAMINE SULFATE AND AMPHETAMINE SULFATE 2.5; 2.5; 2.5; 2.5 MG/1; MG/1; MG/1; MG/1
10 TABLET ORAL DAILY
Qty: 30 TABLET | Refills: 0 | Status: SHIPPED | OUTPATIENT
Start: 2018-03-08 | End: 2018-05-01 | Stop reason: SDUPTHER

## 2018-03-11 ENCOUNTER — RESULTS ENCOUNTER (OUTPATIENT)
Dept: GASTROENTEROLOGY | Facility: CLINIC | Age: 38
End: 2018-03-11

## 2018-03-11 DIAGNOSIS — R74.8 ELEVATED LIVER ENZYMES: ICD-10-CM

## 2018-03-14 LAB
ALBUMIN SERPL-MCNC: 4.5 G/DL (ref 3.5–5.2)
ALBUMIN/GLOB SERPL: 1.6 G/DL
ALP SERPL-CCNC: 39 U/L (ref 39–117)
ALT SERPL-CCNC: 46 U/L (ref 1–33)
AST SERPL-CCNC: 31 U/L (ref 1–32)
BILIRUB SERPL-MCNC: 0.4 MG/DL (ref 0.1–1.2)
BUN SERPL-MCNC: 9 MG/DL (ref 6–20)
BUN/CREAT SERPL: 10.2 (ref 7–25)
CALCIUM SERPL-MCNC: 9.7 MG/DL (ref 8.6–10.5)
CHLORIDE SERPL-SCNC: 104 MMOL/L (ref 98–107)
CO2 SERPL-SCNC: 26.7 MMOL/L (ref 22–29)
CREAT SERPL-MCNC: 0.88 MG/DL (ref 0.57–1)
GFR SERPLBLD CREATININE-BSD FMLA CKD-EPI: 73 ML/MIN/1.73
GFR SERPLBLD CREATININE-BSD FMLA CKD-EPI: 88 ML/MIN/1.73
GLOBULIN SER CALC-MCNC: 2.9 GM/DL
GLUCOSE SERPL-MCNC: 105 MG/DL (ref 65–99)
POTASSIUM SERPL-SCNC: 5.2 MMOL/L (ref 3.5–5.2)
PROT SERPL-MCNC: 7.4 G/DL (ref 6–8.5)
SODIUM SERPL-SCNC: 142 MMOL/L (ref 136–145)

## 2018-04-27 ENCOUNTER — OFFICE VISIT (OUTPATIENT)
Dept: SLEEP MEDICINE | Facility: HOSPITAL | Age: 38
End: 2018-04-27
Attending: INTERNAL MEDICINE

## 2018-04-27 VITALS
SYSTOLIC BLOOD PRESSURE: 151 MMHG | BODY MASS INDEX: 33.42 KG/M2 | OXYGEN SATURATION: 98 % | WEIGHT: 177 LBS | HEIGHT: 61 IN | HEART RATE: 62 BPM | DIASTOLIC BLOOD PRESSURE: 82 MMHG

## 2018-04-27 DIAGNOSIS — G47.10 HYPERSOMNIA: ICD-10-CM

## 2018-04-27 DIAGNOSIS — F51.3 SLEEP WALKING: ICD-10-CM

## 2018-04-27 DIAGNOSIS — G47.33 OBSTRUCTIVE SLEEP APNEA, ADULT: Primary | ICD-10-CM

## 2018-04-27 DIAGNOSIS — E66.9 OBESITY (BMI 30-39.9): ICD-10-CM

## 2018-04-27 PROCEDURE — G0463 HOSPITAL OUTPT CLINIC VISIT: HCPCS

## 2018-05-01 ENCOUNTER — OFFICE VISIT (OUTPATIENT)
Dept: GASTROENTEROLOGY | Facility: CLINIC | Age: 38
End: 2018-05-01

## 2018-05-01 VITALS
SYSTOLIC BLOOD PRESSURE: 106 MMHG | BODY MASS INDEX: 33.79 KG/M2 | DIASTOLIC BLOOD PRESSURE: 84 MMHG | WEIGHT: 179 LBS | HEIGHT: 61 IN

## 2018-05-01 DIAGNOSIS — K76.0 NAFLD (NONALCOHOLIC FATTY LIVER DISEASE): Primary | ICD-10-CM

## 2018-05-01 DIAGNOSIS — E66.09 EXOGENOUS OBESITY: ICD-10-CM

## 2018-05-01 PROCEDURE — 99212 OFFICE O/P EST SF 10 MIN: CPT | Performed by: INTERNAL MEDICINE

## 2018-05-01 RX ORDER — DEXTROAMPHETAMINE SACCHARATE, AMPHETAMINE ASPARTATE, DEXTROAMPHETAMINE SULFATE AND AMPHETAMINE SULFATE 2.5; 2.5; 2.5; 2.5 MG/1; MG/1; MG/1; MG/1
10 TABLET ORAL DAILY
Qty: 30 TABLET | Refills: 0 | Status: SHIPPED | OUTPATIENT
Start: 2018-05-01 | End: 2018-06-29 | Stop reason: SDUPTHER

## 2018-05-01 NOTE — PROGRESS NOTES
Chief Complaint   Patient presents with   • Follow-up   • Fatty liver    • Elevated Hepatic Enzymes     Subjective     HPI     Skyla Willams is a 37 y.o. female who presents today for follow up.  Skyla is being followed for elevated hepatic enzymes, felt to be related to underlying NAFLD.  She had had full serologic workup which was negative.  Abdominal u/s confirmed hepatic steatosis.  She has recently been started on Actos by PCP for IFG.  Weight stable, but pt has been working of lifestyle modification and has noticed a significant decrease in waist size since last visit.   Review of most recent labs show decrease in LFTs, with normalization of AST and now only minimal elevation of ALT.      Past Medical History:   Diagnosis Date   • Dysautonomia    • Fatty liver    • Hiatal hernia    • Irritable bowel syndrome    • Sleep apnea    • Vitamin D deficiency        Social History     Social History   • Marital status:      Social History Main Topics   • Smoking status: Never Smoker   • Smokeless tobacco: Never Used   • Alcohol use Yes      Comment: seldom   • Drug use: No   • Sexual activity: Defer     Other Topics Concern   • Not on file         Current Outpatient Prescriptions:   •  allopurinol (ZYLOPRIM) 100 MG tablet, Take 1 tablet by mouth Daily., Disp: 30 tablet, Rfl: 5  •  amphetamine-dextroamphetamine (ADDERALL) 10 MG tablet, Take 1 tablet by mouth Daily., Disp: 30 tablet, Rfl: 0  •  cholecalciferol (VITAMIN D3) 1000 units tablet, Take 10,000 Units by mouth Daily., Disp: , Rfl:   •  fludrocortisone 0.1 MG tablet, Take 1 tablet by mouth Daily., Disp: 30 tablet, Rfl: 3  •  gabapentin (NEURONTIN) 300 MG capsule, 1 capsule twice a day, Disp: 180 capsule, Rfl: 1  •  pioglitazone (ACTOS) 30 MG tablet, Take 1 tablet by mouth Daily., Disp: 30 tablet, Rfl: 11  •  traMADol (ULTRAM) 50 MG tablet, Take 50 mg by mouth., Disp: , Rfl:   •  amitriptyline (ELAVIL) 10 MG tablet, Take 1 tablet by mouth Every Night., Disp: 90  tablet, Rfl: 3    Review of Systems   Constitutional: Negative.    Cardiovascular: Negative.    Gastrointestinal: Negative.        Objective   Vitals:    05/01/18 1304   BP: 106/84       Physical Exam   Constitutional: She is oriented to person, place, and time. She appears well-developed and well-nourished.   HENT:   Head: Normocephalic and atraumatic.   Abdominal: Soft. Bowel sounds are normal. She exhibits no distension and no mass. There is no tenderness. No hernia.   Neurological: She is alert and oriented to person, place, and time.   Skin: Skin is warm and dry.   Psychiatric: She has a normal mood and affect. Her behavior is normal. Judgment and thought content normal.   Vitals reviewed.      Assessment/Plan   Assessment:     1. NAFLD (nonalcoholic fatty liver disease)    2. Exogenous obesity      Plan:   Continue with lifetstyle modification - pt seems to be committed to dietary modification and increased exercise.  If she can continue on her current path I think she will do well from a liver standpoint.  I think the Actos is a good choice for her IFG given her NAFLD and she should continue this for now.  She will otherwise f/u in 6 mos with labs 1 week prior.            Zhao Sim M.D.  [facility]  [address] [place] [address]  Office: [phone]

## 2018-05-07 RX ORDER — FLUDROCORTISONE ACETATE 0.1 MG/1
TABLET ORAL
Qty: 30 TABLET | Refills: 2 | Status: SHIPPED | OUTPATIENT
Start: 2018-05-07 | End: 2018-08-21 | Stop reason: SDUPTHER

## 2018-06-29 RX ORDER — DEXTROAMPHETAMINE SACCHARATE, AMPHETAMINE ASPARTATE, DEXTROAMPHETAMINE SULFATE AND AMPHETAMINE SULFATE 2.5; 2.5; 2.5; 2.5 MG/1; MG/1; MG/1; MG/1
10 TABLET ORAL DAILY
Qty: 30 TABLET | Refills: 0 | Status: SHIPPED | OUTPATIENT
Start: 2018-06-29 | End: 2018-08-09 | Stop reason: SDUPTHER

## 2018-07-20 ENCOUNTER — APPOINTMENT (OUTPATIENT)
Dept: SLEEP MEDICINE | Facility: HOSPITAL | Age: 38
End: 2018-07-20
Attending: INTERNAL MEDICINE

## 2018-08-07 ENCOUNTER — RESULTS ENCOUNTER (OUTPATIENT)
Dept: ENDOCRINOLOGY | Age: 38
End: 2018-08-07

## 2018-08-07 DIAGNOSIS — E55.9 VITAMIN D DEFICIENCY: ICD-10-CM

## 2018-08-07 DIAGNOSIS — K75.81 NASH (NONALCOHOLIC STEATOHEPATITIS): ICD-10-CM

## 2018-08-07 DIAGNOSIS — E79.0 HYPERURICEMIA: ICD-10-CM

## 2018-08-07 DIAGNOSIS — E88.81 INSULIN RESISTANCE SYNDROME: ICD-10-CM

## 2018-08-09 ENCOUNTER — OFFICE VISIT (OUTPATIENT)
Dept: INTERNAL MEDICINE | Facility: CLINIC | Age: 38
End: 2018-08-09

## 2018-08-09 VITALS
RESPIRATION RATE: 15 BRPM | TEMPERATURE: 98.9 F | DIASTOLIC BLOOD PRESSURE: 79 MMHG | WEIGHT: 183 LBS | SYSTOLIC BLOOD PRESSURE: 116 MMHG | HEIGHT: 61 IN | HEART RATE: 112 BPM | OXYGEN SATURATION: 97 % | BODY MASS INDEX: 34.55 KG/M2

## 2018-08-09 DIAGNOSIS — K75.81 NASH (NONALCOHOLIC STEATOHEPATITIS): Primary | ICD-10-CM

## 2018-08-09 DIAGNOSIS — G89.4 CHRONIC PAIN SYNDROME: ICD-10-CM

## 2018-08-09 DIAGNOSIS — E66.09 EXOGENOUS OBESITY: ICD-10-CM

## 2018-08-09 DIAGNOSIS — F98.8 ATTENTION DEFICIT DISORDER (ADD) WITHOUT HYPERACTIVITY: ICD-10-CM

## 2018-08-09 DIAGNOSIS — R53.83 OTHER FATIGUE: ICD-10-CM

## 2018-08-09 DIAGNOSIS — R53.82 CHRONIC FATIGUE: ICD-10-CM

## 2018-08-09 PROCEDURE — 99214 OFFICE O/P EST MOD 30 MIN: CPT | Performed by: INTERNAL MEDICINE

## 2018-08-09 RX ORDER — DEXTROAMPHETAMINE SACCHARATE, AMPHETAMINE ASPARTATE, DEXTROAMPHETAMINE SULFATE AND AMPHETAMINE SULFATE 2.5; 2.5; 2.5; 2.5 MG/1; MG/1; MG/1; MG/1
10 TABLET ORAL DAILY
Qty: 30 TABLET | Refills: 0 | Status: SHIPPED | OUTPATIENT
Start: 2018-08-09 | End: 2018-09-10 | Stop reason: SDUPTHER

## 2018-08-09 RX ORDER — GABAPENTIN 300 MG/1
CAPSULE ORAL
Qty: 360 CAPSULE | Refills: 1 | Status: SHIPPED | OUTPATIENT
Start: 2018-08-09 | End: 2019-04-08 | Stop reason: SDUPTHER

## 2018-08-14 ENCOUNTER — RESULTS ENCOUNTER (OUTPATIENT)
Dept: INTERNAL MEDICINE | Facility: CLINIC | Age: 38
End: 2018-08-14

## 2018-08-14 DIAGNOSIS — R53.83 OTHER FATIGUE: ICD-10-CM

## 2018-08-21 RX ORDER — FLUDROCORTISONE ACETATE 0.1 MG/1
TABLET ORAL
Qty: 30 TABLET | Refills: 1 | Status: SHIPPED | OUTPATIENT
Start: 2018-08-21 | End: 2018-10-22 | Stop reason: SDUPTHER

## 2018-08-23 NOTE — PROGRESS NOTES
Subjective   Skyla Willams is a 38 y.o. female.   She is here to follow-up for nonalcoholic steatohepatitis chronic pain syndrome chronic fatigue ADD exogenous obesity  History of Present Illness   She is here today for nonalcoholic steatohepatitis and her weight is getting worse not better which will make the liver enzymes go up and chronic pain syndrome which is still bothering her and she needs gabapentin for this and ADD which is stable on Adderall and chronic fatigue which is bothering her getting worse and we will check myasthenia panel  The following portions of the patient's history were reviewed and updated as appropriate: allergies, current medications, past family history, past medical history, past social history, past surgical history and problem list.    Review of Systems   Constitutional: Positive for fatigue.        Chronic pain syndrome   Musculoskeletal: Positive for arthralgias.   All other systems reviewed and are negative.      Objective   Physical Exam   Constitutional: She is oriented to person, place, and time. She appears well-developed and well-nourished. She is cooperative.   HENT:   Head: Normocephalic and atraumatic.   Right Ear: Hearing, tympanic membrane, external ear and ear canal normal.   Left Ear: Hearing, tympanic membrane, external ear and ear canal normal.   Nose: Nose normal.   Mouth/Throat: Uvula is midline, oropharynx is clear and moist and mucous membranes are normal.   Eyes: Pupils are equal, round, and reactive to light. Conjunctivae, EOM and lids are normal.   Neck: Phonation normal. Neck supple. Carotid bruit is not present.   Cardiovascular: Normal rate, regular rhythm and normal heart sounds.  Exam reveals no gallop and no friction rub.    No murmur heard.  Pulmonary/Chest: Effort normal and breath sounds normal. No respiratory distress.   Abdominal: Soft. Bowel sounds are normal. She exhibits no distension and no mass. There is no hepatosplenomegaly. There is no  tenderness. There is no rebound and no guarding. No hernia.   Musculoskeletal: She exhibits tenderness (multiple joints throughout body). She exhibits no edema.   Neurological: She is alert and oriented to person, place, and time. Coordination and gait normal.   Skin: Skin is warm and dry.   Psychiatric: She has a normal mood and affect. Her speech is normal and behavior is normal. Judgment and thought content normal.   Nursing note and vitals reviewed.      Assessment/Plan   Diagnoses and all orders for this visit:    BRENNER (nonalcoholic steatohepatitis)    Chronic pain syndrome    Chronic fatigue    Attention deficit disorder (ADD) without hyperactivity    Exogenous obesity    Other fatigue  -     Myasthenia Gravis Full Panel; Future    Other orders  -     gabapentin (NEURONTIN) 300 MG capsule; Take 1 capsule in morning then 1 capsule early afternoon and 2 capsules at bedtime  -     amphetamine-dextroamphetamine (ADDERALL) 10 MG tablet; Take 1 tablet by mouth Daily.      Nonalcoholic steatohepatitis this will only get worse as she gains weight and we will work on weight loss with proper diet and exercise  Chronic pain syndrome noted and we will provide gabapentin for this to keep it from getting worse and hopefully to help improve it  Chronic fatigue noted as well and we will check myasthenia gravis panel and chronic fatigue is getting worse as well  ADD stable on current medication  Exogenous obesity getting worse not better and she will work on weight loss

## 2018-08-30 LAB
25(OH)D3+25(OH)D2 SERPL-MCNC: 59.6 NG/ML (ref 30–100)
ACHR BIND AB SER-SCNC: <0.03 NMOL/L (ref 0–0.24)
ACHR BLOCK AB SER-ACNC: 13 % (ref 0–25)
ACHR MOD AB/ACHR TOTAL SFR SER: <12 % (ref 0–20)
ALBUMIN SERPL-MCNC: 4.2 G/DL (ref 3.5–5.2)
ALBUMIN/GLOB SERPL: 1.4 G/DL
ALP SERPL-CCNC: 48 U/L (ref 39–117)
ALT SERPL-CCNC: 15 U/L (ref 1–33)
AST SERPL-CCNC: 13 U/L (ref 1–32)
BILIRUB SERPL-MCNC: <0.2 MG/DL (ref 0.1–1.2)
BUN SERPL-MCNC: 12 MG/DL (ref 6–20)
BUN/CREAT SERPL: 16.4 (ref 7–25)
C PEPTIDE SERPL-MCNC: 3.3 NG/ML (ref 1.1–4.4)
CALCIUM SERPL-MCNC: 9.1 MG/DL (ref 8.6–10.5)
CHLORIDE SERPL-SCNC: 104 MMOL/L (ref 98–107)
CHOLEST SERPL-MCNC: 167 MG/DL (ref 0–200)
CO2 SERPL-SCNC: 23.3 MMOL/L (ref 22–29)
CREAT SERPL-MCNC: 0.73 MG/DL (ref 0.57–1)
GLOBULIN SER CALC-MCNC: 3 GM/DL
GLUCOSE SERPL-MCNC: 88 MG/DL (ref 65–99)
HBA1C MFR BLD: 5.1 % (ref 4.8–5.6)
HDLC SERPL-MCNC: 46 MG/DL (ref 40–60)
LDLC SERPL CALC-MCNC: 97 MG/DL (ref 0–100)
POTASSIUM SERPL-SCNC: 4 MMOL/L (ref 3.5–5.2)
PROT SERPL-MCNC: 7.2 G/DL (ref 6–8.5)
SODIUM SERPL-SCNC: 140 MMOL/L (ref 136–145)
STRIA MUS AB TITR SER IF: NEGATIVE {TITER}
T3FREE SERPL-MCNC: 3.4 PG/ML (ref 2–4.4)
T4 FREE SERPL-MCNC: 1.19 NG/DL (ref 0.93–1.7)
T4 SERPL-MCNC: 7.89 MCG/DL (ref 4.5–11.7)
TRIGL SERPL-MCNC: 121 MG/DL (ref 0–150)
TSH SERPL DL<=0.005 MIU/L-ACNC: 3.74 MIU/ML (ref 0.27–4.2)
URATE SERPL-MCNC: 3.5 MG/DL (ref 2.4–5.7)
VLDLC SERPL CALC-MCNC: 24.2 MG/DL (ref 5–40)

## 2018-09-06 ENCOUNTER — OFFICE VISIT (OUTPATIENT)
Dept: ENDOCRINOLOGY | Age: 38
End: 2018-09-06

## 2018-09-06 VITALS
SYSTOLIC BLOOD PRESSURE: 118 MMHG | DIASTOLIC BLOOD PRESSURE: 62 MMHG | WEIGHT: 180 LBS | HEIGHT: 61 IN | BODY MASS INDEX: 33.99 KG/M2 | RESPIRATION RATE: 16 BRPM

## 2018-09-06 DIAGNOSIS — R53.82 CHRONIC FATIGUE: ICD-10-CM

## 2018-09-06 DIAGNOSIS — E88.81 INSULIN RESISTANCE SYNDROME: Primary | ICD-10-CM

## 2018-09-06 DIAGNOSIS — E79.0 HYPERURICEMIA: ICD-10-CM

## 2018-09-06 DIAGNOSIS — E66.09 EXOGENOUS OBESITY: ICD-10-CM

## 2018-09-06 DIAGNOSIS — E55.9 VITAMIN D DEFICIENCY: ICD-10-CM

## 2018-09-06 PROCEDURE — 99214 OFFICE O/P EST MOD 30 MIN: CPT | Performed by: INTERNAL MEDICINE

## 2018-09-06 RX ORDER — PIOGLITAZONE HCL AND METFORMIN HCL 850; 15 MG/1; MG/1
1 TABLET ORAL 2 TIMES DAILY WITH MEALS
Qty: 60 TABLET | Refills: 5 | Status: SHIPPED | OUTPATIENT
Start: 2018-09-06 | End: 2019-01-15

## 2018-09-06 RX ORDER — EXENATIDE 2 MG/.85ML
2 INJECTION, SUSPENSION, EXTENDED RELEASE SUBCUTANEOUS WEEKLY
Qty: 4 PEN | Refills: 5 | Status: SHIPPED | OUTPATIENT
Start: 2018-09-06 | End: 2019-02-13

## 2018-09-06 RX ORDER — ALLOPURINOL 100 MG/1
100 TABLET ORAL DAILY
Qty: 30 TABLET | Refills: 5 | Status: SHIPPED | OUTPATIENT
Start: 2018-09-06 | End: 2019-08-14 | Stop reason: SDUPTHER

## 2018-09-06 NOTE — PROGRESS NOTES
"Subjective   Skyla Willams is a 38 y.o. female seen for follow up for hyperuricemia, vit d deficiency, insulin resistance, lab review. She denies any problems or concerns.     History of Present Illness this is a 38-year-old female known patient with insulin resistance and PCO S with hyperuricemia and vitamin D deficiency and obesity.  Over the course of last 6 months she has had no significant health problems for which to go to the emergency room or hospital.  She still complains of being very tired and frustrated with her inability to lose weight.  She also has significant dysmenorrhea.    /62   Resp 16   Ht 154.9 cm (61\")   Wt 81.6 kg (180 lb)   BMI 34.01 kg/m²      Allergies   Allergen Reactions   • Cymbalta [Duloxetine Hcl]    • Penicillins Nausea Only       Current Outpatient Prescriptions:   •  allopurinol (ZYLOPRIM) 100 MG tablet, Take 1 tablet by mouth Daily., Disp: 30 tablet, Rfl: 5  •  amphetamine-dextroamphetamine (ADDERALL) 10 MG tablet, Take 1 tablet by mouth Daily., Disp: 30 tablet, Rfl: 0  •  cholecalciferol (VITAMIN D3) 1000 units tablet, Take 10,000 Units by mouth Daily., Disp: , Rfl:   •  fludrocortisone 0.1 MG tablet, TAKE ONE TABLET BY MOUTH DAILY, Disp: 30 tablet, Rfl: 1  •  gabapentin (NEURONTIN) 300 MG capsule, Take 1 capsule in morning then 1 capsule early afternoon and 2 capsules at bedtime, Disp: 360 capsule, Rfl: 1  •  pioglitazone (ACTOS) 30 MG tablet, Take 1 tablet by mouth Daily., Disp: 30 tablet, Rfl: 11  •  traMADol (ULTRAM) 50 MG tablet, Take 50 mg by mouth., Disp: , Rfl:       The following portions of the patient's history were reviewed and updated as appropriate: allergies, current medications, past family history, past medical history, past social history, past surgical history and problem list.    Review of Systems   Constitutional: Negative.    HENT: Negative.    Eyes: Negative.    Respiratory: Negative.    Cardiovascular: Negative.    Gastrointestinal: Negative.  "   Endocrine: Negative.    Genitourinary: Negative.    Musculoskeletal: Negative.    Skin: Negative.    Allergic/Immunologic: Negative.    Neurological: Negative.    Hematological: Negative.    Psychiatric/Behavioral: Negative.        Objective   Physical Exam   Constitutional: She is oriented to person, place, and time. She appears well-developed and well-nourished. No distress.   HENT:   Head: Normocephalic and atraumatic.   Right Ear: External ear normal.   Left Ear: External ear normal.   Nose: Nose normal.   Mouth/Throat: Oropharynx is clear and moist. No oropharyngeal exudate.   Eyes: Pupils are equal, round, and reactive to light. Conjunctivae and EOM are normal. Right eye exhibits no discharge. Left eye exhibits no discharge. No scleral icterus.   Neck: Normal range of motion. Neck supple. No JVD present. No tracheal deviation present. No thyromegaly present.   Cardiovascular: Normal rate, regular rhythm, normal heart sounds and intact distal pulses.  Exam reveals no gallop and no friction rub.    No murmur heard.  Pulmonary/Chest: Effort normal and breath sounds normal. No stridor. No respiratory distress. She has no wheezes. She has no rales. She exhibits no tenderness.   Abdominal: Soft. Bowel sounds are normal. She exhibits no distension and no mass. There is no tenderness. There is no rebound and no guarding. No hernia.   Musculoskeletal: Normal range of motion. She exhibits no edema, tenderness or deformity.   Lymphadenopathy:     She has no cervical adenopathy.   Neurological: She is alert and oriented to person, place, and time. She has normal reflexes. She displays normal reflexes. No cranial nerve deficit or sensory deficit. She exhibits normal muscle tone. Coordination normal.   Skin: Skin is warm and dry. No rash noted. She is not diaphoretic. No erythema. No pallor.   Light hirsutism on her chin as well as light acanthosis nigricans around her neck.   Psychiatric: She has a normal mood and  affect. Her behavior is normal. Judgment and thought content normal.   Nursing note and vitals reviewed.       Results for orders placed or performed in visit on 08/14/18   Myasthenia Gravis Full Panel   Result Value Ref Range    AChR Binding Ab <0.03 0.00 - 0.24 nmol/L    AChR Blocking Abs 13 0 - 25 %    Acetylcholine Modulating Ab <12 0 - 20 %    Striated Muscle Antibody Negative Neg:<1:40   Comprehensive Metabolic Panel   Result Value Ref Range    Glucose 88 65 - 99 mg/dL    BUN 12 6 - 20 mg/dL    Creatinine 0.73 0.57 - 1.00 mg/dL    eGFR Non African Am 90 >60 mL/min/1.73    eGFR African Am 109 >60 mL/min/1.73    BUN/Creatinine Ratio 16.4 7.0 - 25.0    Sodium 140 136 - 145 mmol/L    Potassium 4.0 3.5 - 5.2 mmol/L    Chloride 104 98 - 107 mmol/L    Total CO2 23.3 22.0 - 29.0 mmol/L    Calcium 9.1 8.6 - 10.5 mg/dL    Total Protein 7.2 6.0 - 8.5 g/dL    Albumin 4.20 3.50 - 5.20 g/dL    Globulin 3.0 gm/dL    A/G Ratio 1.4 g/dL    Total Bilirubin <0.2 0.1 - 1.2 mg/dL    Alkaline Phosphatase 48 39 - 117 U/L    AST (SGOT) 13 1 - 32 U/L    ALT (SGPT) 15 1 - 33 U/L   Lipid Panel   Result Value Ref Range    Total Cholesterol 167 0 - 200 mg/dL    Triglycerides 121 0 - 150 mg/dL    HDL Cholesterol 46 40 - 60 mg/dL    VLDL Cholesterol 24.2 5 - 40 mg/dL    LDL Cholesterol  97 0 - 100 mg/dL   T4 & TSH ([facility])   Result Value Ref Range    TSH 3.740 0.270 - 4.200 mIU/mL    T4, Total 7.89 4.50 - 11.70 mcg/dL   Hemoglobin A1c   Result Value Ref Range    Hemoglobin A1C 5.10 4.80 - 5.60 %   T4, Free   Result Value Ref Range    Free T4 1.19 0.93 - 1.70 ng/dL   C-Peptide   Result Value Ref Range    C-Peptide 3.3 1.1 - 4.4 ng/mL   Vitamin D 25 Hydroxy   Result Value Ref Range    25 Hydroxy, Vitamin D 59.6 30.0 - 100.0 ng/ml   Uric Acid   Result Value Ref Range    Uric Acid 3.5 2.4 - 5.7 mg/dL   T3, Free   Result Value Ref Range    T3, Free 3.4 2.0 - 4.4 pg/mL         Assessment/Plan   Diagnoses and all orders for this visit:    Insulin  resistance syndrome  -     T3, Free; Future  -     T4 & TSH ([facility]); Future  -     T4, Free; Future  -     Thyroglobulin With Anti-TG; Future  -     Uric Acid; Future  -     Vitamin D 25 Hydroxy; Future  -     Comprehensive Metabolic Panel; Future  -     C-Peptide; Future  -     Hemoglobin A1c; Future  -     Lipid Panel; Future  -     Follicle Stimulating Hormone; Future  -     Luteinizing Hormone; Future  -     ACTH; Future  -     Cortisol; Future    Hyperuricemia  -     T3, Free; Future  -     T4 & TSH ([facility]); Future  -     T4, Free; Future  -     Thyroglobulin With Anti-TG; Future  -     Uric Acid; Future  -     Vitamin D 25 Hydroxy; Future  -     Comprehensive Metabolic Panel; Future  -     C-Peptide; Future  -     Hemoglobin A1c; Future  -     Lipid Panel; Future  -     Follicle Stimulating Hormone; Future  -     Luteinizing Hormone; Future  -     ACTH; Future  -     Cortisol; Future    Chronic fatigue  -     T3, Free; Future  -     T4 & TSH ([facility]); Future  -     T4, Free; Future  -     Thyroglobulin With Anti-TG; Future  -     Uric Acid; Future  -     Vitamin D 25 Hydroxy; Future  -     Comprehensive Metabolic Panel; Future  -     C-Peptide; Future  -     Hemoglobin A1c; Future  -     Lipid Panel; Future  -     Follicle Stimulating Hormone; Future  -     Luteinizing Hormone; Future  -     ACTH; Future  -     Cortisol; Future    Vitamin D deficiency  -     T3, Free; Future  -     T4 & TSH ([facility]); Future  -     T4, Free; Future  -     Thyroglobulin With Anti-TG; Future  -     Uric Acid; Future  -     Vitamin D 25 Hydroxy; Future  -     Comprehensive Metabolic Panel; Future  -     C-Peptide; Future  -     Hemoglobin A1c; Future  -     Lipid Panel; Future  -     Follicle Stimulating Hormone; Future  -     Luteinizing Hormone; Future  -     ACTH; Future  -     Cortisol; Future    Exogenous obesity  -     T3, Free; Future  -     T4 & TSH ([facility]); Future  -     T4, Free; Future  -     Thyroglobulin With  Anti-TG; Future  -     Uric Acid; Future  -     Vitamin D 25 Hydroxy; Future  -     Comprehensive Metabolic Panel; Future  -     C-Peptide; Future  -     Hemoglobin A1c; Future  -     Lipid Panel; Future  -     Follicle Stimulating Hormone; Future  -     Luteinizing Hormone; Future  -     ACTH; Future  -     Cortisol; Future    Other orders  -     BYDUREON BCISE 2 MG/0.85ML auto-injector injection; Inject 0.85 mL under the skin into the appropriate area as directed 1 (One) Time Per Week.  -     pioglitazone-metFORMIN (ACTOPLUS MET)  MG per tablet; Take 1 tablet by mouth 2 (Two) Times a Day With Meals.  -     allopurinol (ZYLOPRIM) 100 MG tablet; Take 1 tablet by mouth Daily.               In his summary I saw and examined this 38-year-old female for above-mentioned problems.  I reviewed her laboratory evaluation of 08/23/2018 and provided her with a hard copy of it.  She is clinically and metabolically stable and therefore we will go ahead and continue all her current prescriptions.  I will see her in 6 months or sooner if needed with laboratory evaluation prior to each office visit.

## 2018-09-11 RX ORDER — DEXTROAMPHETAMINE SACCHARATE, AMPHETAMINE ASPARTATE, DEXTROAMPHETAMINE SULFATE AND AMPHETAMINE SULFATE 2.5; 2.5; 2.5; 2.5 MG/1; MG/1; MG/1; MG/1
10 TABLET ORAL DAILY
Qty: 30 TABLET | Refills: 0 | Status: SHIPPED | OUTPATIENT
Start: 2018-09-11 | End: 2018-10-17 | Stop reason: SDUPTHER

## 2018-10-18 RX ORDER — DEXTROAMPHETAMINE SACCHARATE, AMPHETAMINE ASPARTATE, DEXTROAMPHETAMINE SULFATE AND AMPHETAMINE SULFATE 2.5; 2.5; 2.5; 2.5 MG/1; MG/1; MG/1; MG/1
10 TABLET ORAL DAILY
Qty: 30 TABLET | Refills: 0 | Status: SHIPPED | OUTPATIENT
Start: 2018-10-18 | End: 2018-12-10 | Stop reason: SDUPTHER

## 2018-10-22 RX ORDER — FLUDROCORTISONE ACETATE 0.1 MG/1
TABLET ORAL
Qty: 30 TABLET | Refills: 0 | Status: SHIPPED | OUTPATIENT
Start: 2018-10-22 | End: 2019-01-02 | Stop reason: SDUPTHER

## 2018-12-10 RX ORDER — DEXTROAMPHETAMINE SACCHARATE, AMPHETAMINE ASPARTATE, DEXTROAMPHETAMINE SULFATE AND AMPHETAMINE SULFATE 2.5; 2.5; 2.5; 2.5 MG/1; MG/1; MG/1; MG/1
10 TABLET ORAL DAILY
Qty: 30 TABLET | Refills: 0 | Status: SHIPPED | OUTPATIENT
Start: 2018-12-10 | End: 2019-02-01 | Stop reason: SDUPTHER

## 2019-01-02 RX ORDER — FLUDROCORTISONE ACETATE 0.1 MG/1
TABLET ORAL
Qty: 30 TABLET | Refills: 0 | Status: SHIPPED | OUTPATIENT
Start: 2019-01-02 | End: 2019-02-01 | Stop reason: SDUPTHER

## 2019-01-15 RX ORDER — PIOGLITAZONEHYDROCHLORIDE 30 MG/1
30 TABLET ORAL DAILY
Qty: 30 TABLET | Refills: 11 | Status: SHIPPED | OUTPATIENT
Start: 2019-01-15 | End: 2019-04-03

## 2019-02-01 RX ORDER — DEXTROAMPHETAMINE SACCHARATE, AMPHETAMINE ASPARTATE, DEXTROAMPHETAMINE SULFATE AND AMPHETAMINE SULFATE 2.5; 2.5; 2.5; 2.5 MG/1; MG/1; MG/1; MG/1
10 TABLET ORAL DAILY
Qty: 30 TABLET | Refills: 0 | Status: SHIPPED | OUTPATIENT
Start: 2019-02-01 | End: 2019-03-06 | Stop reason: SDUPTHER

## 2019-02-01 RX ORDER — FLUDROCORTISONE ACETATE 0.1 MG/1
TABLET ORAL
Qty: 30 TABLET | Refills: 0 | Status: SHIPPED | OUTPATIENT
Start: 2019-02-01 | End: 2019-03-15 | Stop reason: SDUPTHER

## 2019-02-13 ENCOUNTER — OFFICE VISIT (OUTPATIENT)
Dept: INTERNAL MEDICINE | Facility: CLINIC | Age: 39
End: 2019-02-13

## 2019-02-13 VITALS
WEIGHT: 180.6 LBS | BODY MASS INDEX: 34.1 KG/M2 | HEART RATE: 107 BPM | SYSTOLIC BLOOD PRESSURE: 138 MMHG | DIASTOLIC BLOOD PRESSURE: 86 MMHG | HEIGHT: 61 IN | OXYGEN SATURATION: 97 % | RESPIRATION RATE: 16 BRPM | TEMPERATURE: 98.8 F

## 2019-02-13 DIAGNOSIS — R59.1 LYMPHADENOPATHY, GENERALIZED: ICD-10-CM

## 2019-02-13 DIAGNOSIS — E55.9 VITAMIN D DEFICIENCY: ICD-10-CM

## 2019-02-13 DIAGNOSIS — L65.9 HAIR LOSS: ICD-10-CM

## 2019-02-13 DIAGNOSIS — R53.83 OTHER FATIGUE: Primary | ICD-10-CM

## 2019-02-13 PROCEDURE — 99214 OFFICE O/P EST MOD 30 MIN: CPT | Performed by: INTERNAL MEDICINE

## 2019-02-13 RX ORDER — PYRIDOSTIGMINE BROMIDE 60 MG/1
60 TABLET ORAL 3 TIMES DAILY
Qty: 90 TABLET | Refills: 1 | Status: SHIPPED | OUTPATIENT
Start: 2019-02-13 | End: 2019-04-04 | Stop reason: SDUPTHER

## 2019-02-15 LAB
C-ANCA TITR SER IF: NORMAL TITER
CENTROMERE B AB SER-ACNC: <0.2 AI (ref 0–0.9)
CHROMATIN AB SERPL-ACNC: <0.2 AI (ref 0–0.9)
CRP SERPL-MCNC: 0.8 MG/L (ref 0–4.9)
DSDNA AB SER-ACNC: 2 IU/ML (ref 0–9)
ENA JO1 AB SER-ACNC: <0.2 AI (ref 0–0.9)
ENA RNP AB SER-ACNC: <0.2 AI (ref 0–0.9)
ENA SCL70 AB SER-ACNC: <0.2 AI (ref 0–0.9)
ENA SM AB SER-ACNC: <0.2 AI (ref 0–0.9)
ENA SS-A AB SER-ACNC: <0.2 AI (ref 0–0.9)
ENA SS-B AB SER-ACNC: <0.2 AI (ref 0–0.9)
ERYTHROCYTE [SEDIMENTATION RATE] IN BLOOD BY WESTERGREN METHOD: 5 MM/HR (ref 0–32)
Lab: NORMAL
MYELOPEROXIDASE AB SER IA-ACNC: <9 U/ML (ref 0–9)
P-ANCA ATYPICAL TITR SER IF: NORMAL TITER
P-ANCA TITR SER IF: NORMAL TITER
PROTEINASE3 AB SER IA-ACNC: <3.5 U/ML (ref 0–3.5)

## 2019-02-19 ENCOUNTER — TELEPHONE (OUTPATIENT)
Dept: INTERNAL MEDICINE | Facility: CLINIC | Age: 39
End: 2019-02-19

## 2019-02-19 DIAGNOSIS — G70.00 MYASTHENIA GRAVIS (HCC): Primary | ICD-10-CM

## 2019-02-19 DIAGNOSIS — R47.81 SLURRED SPEECH: Primary | ICD-10-CM

## 2019-02-27 ENCOUNTER — RESULTS ENCOUNTER (OUTPATIENT)
Dept: ENDOCRINOLOGY | Age: 39
End: 2019-02-27

## 2019-02-27 DIAGNOSIS — R53.82 CHRONIC FATIGUE: ICD-10-CM

## 2019-02-27 DIAGNOSIS — E88.81 INSULIN RESISTANCE SYNDROME: ICD-10-CM

## 2019-02-27 DIAGNOSIS — E66.09 EXOGENOUS OBESITY: ICD-10-CM

## 2019-02-27 DIAGNOSIS — E79.0 HYPERURICEMIA: ICD-10-CM

## 2019-02-27 DIAGNOSIS — E55.9 VITAMIN D DEFICIENCY: ICD-10-CM

## 2019-03-01 NOTE — PROGRESS NOTES
Subjective   Skyla Willams is a 38 y.o. female.   She is here today with fatigue which is getting worse along with hair loss lymphadenopathy in general and vitamin D deficiency and her sister has myasthenia gravis which was not diagnosed by regular lab work  History of Present Illness   She is today for fatigue which is getting worse consistent with myasthenia gravis along with hair loss which is bothering her lymphadenopathy in general which is bothering her and vitamin D deficiency which has been stable on supplementation and more than likely she has myasthenia gravis although the lab work did not show it and her sister's lab work did not show hers either  The following portions of the patient's history were reviewed and updated as appropriate: allergies, current medications, past family history, past medical history, past social history, past surgical history and problem list.    Review of Systems   Constitutional: Positive for fatigue.   Endocrine:        Hair loss   Genitourinary: Negative for difficulty urinating.   Neurological: Positive for weakness.   Psychiatric/Behavioral: Negative for dysphoric mood. The patient is nervous/anxious.    All other systems reviewed and are negative.      Objective   Physical Exam   Constitutional: She is oriented to person, place, and time. Vital signs are normal. She appears well-developed and well-nourished. She is active.   HENT:   Head: Normocephalic and atraumatic.   Right Ear: Hearing, tympanic membrane, external ear and ear canal normal.   Left Ear: Hearing, tympanic membrane, external ear and ear canal normal.   Nose: Nose normal.   Mouth/Throat: Uvula is midline, oropharynx is clear and moist and mucous membranes are normal.   Eyes: Conjunctivae, EOM and lids are normal. Pupils are equal, round, and reactive to light. Right eye exhibits no discharge. Left eye exhibits no discharge.   Neck: Trachea normal, normal range of motion, full passive range of motion without  pain and phonation normal. Neck supple. Carotid bruit is not present. No edema present. No thyroid mass and no thyromegaly present.   Cardiovascular: Normal rate, regular rhythm, normal heart sounds, intact distal pulses and normal pulses. Exam reveals no gallop and no friction rub.   No murmur heard.  Pulmonary/Chest: Effort normal and breath sounds normal. No respiratory distress. She has no wheezes. She has no rales.   Abdominal: Soft. Normal appearance, normal aorta and bowel sounds are normal. She exhibits no distension, no abdominal bruit and no mass. There is no hepatosplenomegaly. There is no tenderness. There is no rebound, no guarding and no CVA tenderness. No hernia. Hernia confirmed negative in the right inguinal area and confirmed negative in the left inguinal area.   Musculoskeletal: Normal range of motion. She exhibits no edema or tenderness.     Vascular Status -  Her right foot exhibits normal foot vasculature  and no edema. Her left foot exhibits normal foot vasculature  and no edema.  Skin Integrity  -  Her right foot skin is intact.Her left foot skin is intact..  Lymphadenopathy:     She has no cervical adenopathy.     She has no axillary adenopathy.        Right: No inguinal and no supraclavicular adenopathy present.        Left: No inguinal and no supraclavicular adenopathy present.   Neurological: She is alert and oriented to person, place, and time. She has normal strength. No cranial nerve deficit or sensory deficit. She exhibits normal muscle tone. She displays a negative Romberg sign. Coordination normal.   Significant increasing weakness with repetitive motions   Skin: Skin is warm, dry and intact. No cyanosis. Nails show no clubbing.        Psychiatric: She has a normal mood and affect. Her speech is normal and behavior is normal. Judgment and thought content normal. Cognition and memory are normal.   Nursing note and vitals reviewed.      Assessment/Plan   Diagnoses and all orders for  this visit:    Other fatigue  -     C-reactive Protein  -     Sedimentation Rate  -     [facility] Comprehensive Panel  -     ANCA Panel  -     Sjogren's Antibody, Anti-SS-A / -SS-B    Hair loss    Lymphadenopathy, generalized  -     C-reactive Protein  -     Sedimentation Rate  -     [facility] Comprehensive Panel  -     ANCA Panel  -     Sjogren's Antibody, Anti-SS-A / -SS-B    Vitamin D deficiency    Other orders  -     pyridostigmine (MESTINON) 60 MG tablet; Take 1 tablet by mouth 3 (Three) Times a Day.      Fatigue we will check labs this is more than likely myasthenia and we will to try her on pyridostigmine  Hair loss now stable and we will check labs on this as well  Generalized lymphadenopathy we will check labs based on this although I do not find any today that were exceptionally abnormal  Vitamin D deficiency stable on vitamin D supplementation

## 2019-03-06 RX ORDER — DEXTROAMPHETAMINE SACCHARATE, AMPHETAMINE ASPARTATE, DEXTROAMPHETAMINE SULFATE AND AMPHETAMINE SULFATE 2.5; 2.5; 2.5; 2.5 MG/1; MG/1; MG/1; MG/1
10 TABLET ORAL DAILY
Qty: 30 TABLET | Refills: 0 | Status: SHIPPED | OUTPATIENT
Start: 2019-03-06 | End: 2019-04-03 | Stop reason: SDUPTHER

## 2019-03-13 ENCOUNTER — HOSPITAL ENCOUNTER (OUTPATIENT)
Dept: CT IMAGING | Facility: HOSPITAL | Age: 39
Discharge: HOME | End: 2019-03-13
Admitting: INTERNAL MEDICINE

## 2019-03-13 PROCEDURE — 71260 CT THORAX DX C+: CPT

## 2019-03-13 PROCEDURE — 25010000002 IOPAMIDOL 61 % SOLUTION: Performed by: INTERNAL MEDICINE

## 2019-03-13 RX ADMIN — IOPAMIDOL 75 ML: 612 INJECTION, SOLUTION INTRAVENOUS at 09:42

## 2019-03-18 RX ORDER — FLUDROCORTISONE ACETATE 0.1 MG/1
TABLET ORAL
Qty: 30 TABLET | Refills: 0 | Status: SHIPPED | OUTPATIENT
Start: 2019-03-18 | End: 2019-05-31

## 2019-03-20 ENCOUNTER — HOSPITAL ENCOUNTER (OUTPATIENT)
Dept: MRI IMAGING | Facility: HOSPITAL | Age: 39
Discharge: HOME | End: 2019-03-20
Admitting: INTERNAL MEDICINE

## 2019-03-20 PROCEDURE — 70553 MRI BRAIN STEM W/O & W/DYE: CPT

## 2019-03-20 PROCEDURE — A9577 INJ MULTIHANCE: HCPCS | Performed by: INTERNAL MEDICINE

## 2019-03-20 PROCEDURE — 0 GADOBENATE DIMEGLUMINE 529 MG/ML SOLUTION: Performed by: INTERNAL MEDICINE

## 2019-03-20 RX ADMIN — GADOBENATE DIMEGLUMINE 17 ML: 529 INJECTION, SOLUTION INTRAVENOUS at 15:04

## 2019-04-03 ENCOUNTER — OFFICE VISIT (OUTPATIENT)
Dept: INTERNAL MEDICINE | Facility: CLINIC | Age: 39
End: 2019-04-03

## 2019-04-03 VITALS
HEART RATE: 83 BPM | BODY MASS INDEX: 33.79 KG/M2 | DIASTOLIC BLOOD PRESSURE: 75 MMHG | TEMPERATURE: 99 F | OXYGEN SATURATION: 98 % | WEIGHT: 179 LBS | HEIGHT: 61 IN | RESPIRATION RATE: 16 BRPM | SYSTOLIC BLOOD PRESSURE: 123 MMHG

## 2019-04-03 DIAGNOSIS — G90.A POTS (POSTURAL ORTHOSTATIC TACHYCARDIA SYNDROME): ICD-10-CM

## 2019-04-03 DIAGNOSIS — G70.00 MYASTHENIA GRAVIS (HCC): Primary | ICD-10-CM

## 2019-04-03 DIAGNOSIS — F98.8 ATTENTION DEFICIT DISORDER (ADD) WITHOUT HYPERACTIVITY: ICD-10-CM

## 2019-04-03 PROCEDURE — 99213 OFFICE O/P EST LOW 20 MIN: CPT | Performed by: INTERNAL MEDICINE

## 2019-04-03 RX ORDER — DEXTROAMPHETAMINE SACCHARATE, AMPHETAMINE ASPARTATE, DEXTROAMPHETAMINE SULFATE AND AMPHETAMINE SULFATE 2.5; 2.5; 2.5; 2.5 MG/1; MG/1; MG/1; MG/1
10 TABLET ORAL 2 TIMES DAILY
Qty: 60 TABLET | Refills: 0 | Status: SHIPPED | OUTPATIENT
Start: 2019-04-03 | End: 2019-06-03 | Stop reason: SDUPTHER

## 2019-04-04 LAB
25(OH)D3+25(OH)D2 SERPL-MCNC: 95.6 NG/ML (ref 30–100)
ACTH PLAS-MCNC: 20.8 PG/ML (ref 7.2–63.3)
ALBUMIN SERPL-MCNC: 4.9 G/DL (ref 3.5–5.2)
ALBUMIN/GLOB SERPL: 1.8 G/DL
ALP SERPL-CCNC: 48 U/L (ref 39–117)
ALT SERPL-CCNC: 36 U/L (ref 1–33)
AST SERPL-CCNC: 21 U/L (ref 1–32)
BILIRUB SERPL-MCNC: 0.3 MG/DL (ref 0.2–1.2)
BUN SERPL-MCNC: 9 MG/DL (ref 6–20)
BUN/CREAT SERPL: 11.7 (ref 7–25)
C PEPTIDE SERPL-MCNC: 6.2 NG/ML (ref 1.1–4.4)
CALCIUM SERPL-MCNC: 9.8 MG/DL (ref 8.6–10.5)
CHLORIDE SERPL-SCNC: 104 MMOL/L (ref 98–107)
CHOLEST SERPL-MCNC: 174 MG/DL (ref 0–200)
CO2 SERPL-SCNC: 24.3 MMOL/L (ref 22–29)
CORTIS SERPL-MCNC: 5.6 UG/DL
CREAT SERPL-MCNC: 0.77 MG/DL (ref 0.57–1)
FSH SERPL-ACNC: 3.1 MIU/ML
GLOBULIN SER CALC-MCNC: 2.8 GM/DL
GLUCOSE SERPL-MCNC: 120 MG/DL (ref 65–99)
HBA1C MFR BLD: 5.2 % (ref 4.8–5.6)
HDLC SERPL-MCNC: 47 MG/DL (ref 40–60)
INTERPRETATION: NORMAL
LDLC SERPL CALC-MCNC: 112 MG/DL (ref 0–100)
LH SERPL-ACNC: 1.4 MIU/ML
Lab: NORMAL
POTASSIUM SERPL-SCNC: 4 MMOL/L (ref 3.5–5.2)
PROT SERPL-MCNC: 7.7 G/DL (ref 6–8.5)
SODIUM SERPL-SCNC: 139 MMOL/L (ref 136–145)
T3FREE SERPL-MCNC: 3.3 PG/ML (ref 2–4.4)
T4 FREE SERPL-MCNC: 1.01 NG/DL (ref 0.93–1.7)
T4 SERPL-MCNC: 7.1 MCG/DL (ref 4.5–11.7)
THYROGLOB AB SERPL-ACNC: <1 IU/ML (ref 0–0.9)
THYROGLOB SERPL-MCNC: 6.2 NG/ML (ref 1.5–38.5)
TRIGL SERPL-MCNC: 74 MG/DL (ref 0–150)
TSH SERPL DL<=0.005 MIU/L-ACNC: 1.42 MIU/ML (ref 0.27–4.2)
URATE SERPL-MCNC: 4.5 MG/DL (ref 2.4–5.7)
VLDLC SERPL CALC-MCNC: 14.8 MG/DL

## 2019-04-04 RX ORDER — PYRIDOSTIGMINE BROMIDE 60 MG/1
TABLET ORAL
Qty: 270 TABLET | Refills: 1 | Status: SHIPPED | OUTPATIENT
Start: 2019-04-04 | End: 2020-02-19

## 2019-04-08 RX ORDER — GABAPENTIN 300 MG/1
CAPSULE ORAL
Qty: 360 CAPSULE | Refills: 0 | Status: SHIPPED | OUTPATIENT
Start: 2019-04-08 | End: 2020-02-19

## 2019-04-12 ENCOUNTER — TELEPHONE (OUTPATIENT)
Dept: INTERNAL MEDICINE | Facility: CLINIC | Age: 39
End: 2019-04-12

## 2019-04-12 DIAGNOSIS — G90.A POTS (POSTURAL ORTHOSTATIC TACHYCARDIA SYNDROME): Primary | ICD-10-CM

## 2019-04-12 NOTE — TELEPHONE ENCOUNTER
Tilt table has to be ordered through a cardiologist. Pt does not currently have one. Referral placed and pt notified once she sees Cardiology they will be the ones who have to order the Tilt Table, we cannot order this.    Pt verbalized understanding

## 2019-05-01 ENCOUNTER — OFFICE VISIT (OUTPATIENT)
Dept: CARDIOLOGY | Facility: CLINIC | Age: 39
End: 2019-05-01

## 2019-05-01 VITALS
HEART RATE: 110 BPM | DIASTOLIC BLOOD PRESSURE: 96 MMHG | BODY MASS INDEX: 33.99 KG/M2 | WEIGHT: 180 LBS | OXYGEN SATURATION: 98 % | HEIGHT: 61 IN | SYSTOLIC BLOOD PRESSURE: 144 MMHG

## 2019-05-01 DIAGNOSIS — G90.A POTS (POSTURAL ORTHOSTATIC TACHYCARDIA SYNDROME): ICD-10-CM

## 2019-05-01 DIAGNOSIS — R55 SYNCOPE, UNSPECIFIED SYNCOPE TYPE: Primary | ICD-10-CM

## 2019-05-01 PROCEDURE — 99204 OFFICE O/P NEW MOD 45 MIN: CPT | Performed by: INTERNAL MEDICINE

## 2019-05-01 PROCEDURE — 93000 ELECTROCARDIOGRAM COMPLETE: CPT | Performed by: INTERNAL MEDICINE

## 2019-05-01 NOTE — PROGRESS NOTES
Date of Office Visit: 2019  Encounter Provider: Toni Rodriguez MD  Place of Service: [facility] CARDIOLOGY  Patient Name: Hilary Willams  :1980  Arnold Martinez MD    Chief Complaint   Patient presents with   • Rapid Heart Rate     History of Present Illness    The patient is a 38-year-old white female who is here for cardiac evaluation specifically for postural orthostatic tachycardia syndrome.  Initially a tilt table examination was scheduled but would not be approved by her insurance company until she saw a cardiologist.    I have reviewed this patient's records back several years once available.  The patient states her symptoms began when she was 18 years old after pregnancy.  Apparently she had syncopal episodes at that time associated with the pregnancy.  She also had significant and excessive salt intake that she was encouraged to continue as well as hydration.  Eventually her syncopal episodes dissipated.  She has not had an event in several years now.    The patient has numerous diagnoses.  She has chronic pain syndrome, fibromyalgia, chronic fatigue, dysautonomia, suspected glucose intolerance, sleep apnea as well as now postural orthostatic tachycardia syndrome.  The patient has been placed on Florinef as well as Mestinon for the syndrome.  She does not believe it helped at all with her fatigue.  In addition she was diagnosed with attention deficit disorder and has been on Adderall for several years now.    The patient checks her blood pressure and her heart rate keeps a log on a regular basis.  Her heart rate generally will run in the high 90s and her blood pressures generally have always been under good control.  Occasionally she will notice her heart rate jump up 30 bpm without warning and she claims that she is much more fatigued when she has a higher heart rate.  She is approximately 50 pounds overweight and generally does not do any physical  activity on a regular basis.  Past Medical History:   Diagnosis Date   • Anemia    • Dysautonomia (CMS/HCC)    • Fatigue    • Fatty liver    • Hair loss    • Hiatal hernia    • Irritable bowel syndrome    • Lymphadenopathy    • POTS (postural orthostatic tachycardia syndrome)    • Sleep apnea    • Vitamin D deficiency    • Weakness          Past Surgical History:   Procedure Laterality Date   • UPPER GASTROINTESTINAL ENDOSCOPY  01/21/2009    [facility]           Current Outpatient Medications:   •  allopurinol (ZYLOPRIM) 100 MG tablet, Take 1 tablet by mouth Daily., Disp: 30 tablet, Rfl: 5  •  amphetamine-dextroamphetamine (ADDERALL) 10 MG tablet, Take 1 tablet by mouth 2 (Two) Times a Day., Disp: 60 tablet, Rfl: 0  •  cholecalciferol (VITAMIN D3) 1000 units tablet, Take 10,000 Units by mouth Daily., Disp: , Rfl:   •  fludrocortisone 0.1 MG tablet, TAKE ONE TABLET BY MOUTH DAILY, Disp: 30 tablet, Rfl: 0  •  gabapentin (NEURONTIN) 300 MG capsule, TAKE ONE CAPSULE BY MOUTH EVERY MORNING, THEN ONE CAPSULE IN THE EARLY AFTERNOON AND TWO CAPSULES AT BEDTIME (Patient taking differently: Take 300 mg by mouth 2 (Two) Times a Day. TAKE ONE CAPSULE BY MOUTH EVERY MORNING, THEN ONE CAPSULE IN THE EARLY AFTERNOON AND TWO CAPSULES AT BEDTIME), Disp: 360 capsule, Rfl: 0  •  pyridostigmine (MESTINON) 60 MG tablet, TAKE ONE TABLET BY MOUTH THREE TIMES A DAY, Disp: 270 tablet, Rfl: 1  •  traMADol (ULTRAM) 50 MG tablet, Take 50 mg by mouth Every 8 (Eight) Hours As Needed., Disp: , Rfl:       Social History     Socioeconomic History   • Marital status:      Spouse name: Not on file   • Number of children: Not on file   • Years of education: Not on file   • Highest education level: Not on file   Tobacco Use   • Smoking status: Never Smoker   • Smokeless tobacco: Never Used   • Tobacco comment: caffeine use   Substance and Sexual Activity   • Alcohol use: Yes     Comment: seldom   • Drug use: No   • Sexual activity: Defer         Review  "of Systems   Constitution: Positive for weakness and malaise/fatigue.   HENT: Negative.    Eyes: Negative.    Cardiovascular: Positive for dyspnea on exertion and near-syncope.   Respiratory: Negative.    Endocrine: Negative.    Skin: Negative.    Musculoskeletal: Negative.    Gastrointestinal: Negative.    Neurological: Positive for dizziness, light-headedness and numbness.   Psychiatric/Behavioral: Negative.        Procedures      ECG 12 Lead  Date/Time: 5/1/2019 3:26 PM  Performed by: Toni Rodriguez MD  Authorized by: Toni Rodriguez MD   Comparison: not compared with previous ECG   Previous ECG: no previous ECG available  Rhythm: sinus rhythm  Rate: normal  Conduction: conduction normal  ST Segments: ST segments normal  T Waves: T waves normal  QRS axis: normal                  Objective:    /96 (BP Location: Left arm, Patient Position: Sitting, Cuff Size: Adult)   Pulse 110   Ht 154.9 cm (61\")   Wt 81.6 kg (180 lb)   SpO2 98%   BMI 34.01 kg/m²     I personally took the patient's blood pressure in a supine and upright position.  The pressure was recorded from the right arm.  In the supine position the patient's blood pressure was 135/80.  In the upright position her blood pressure was 140/85.  Her pulse was 95 bpm.  There was no appreciable increase with her sitting up.    Physical Exam   Constitutional: She is oriented to person, place, and time. She appears well-developed and well-nourished.   Overweight   HENT:   Head: Normocephalic.   Eyes: Pupils are equal, round, and reactive to light.   Neck: Normal range of motion. No JVD present. Carotid bruit is not present. No thyromegaly present.   Cardiovascular: Normal rate, regular rhythm, S1 normal, S2 normal, normal heart sounds and intact distal pulses. Exam reveals no gallop and no friction rub.   No murmur heard.  Pulmonary/Chest: Effort normal and breath sounds normal.   Abdominal: Soft. Bowel sounds are normal.   Musculoskeletal: She " exhibits no edema.   Neurological: She is alert and oriented to person, place, and time.   Skin: Skin is warm, dry and intact. No erythema.   Psychiatric: She has a normal mood and affect.   Vitals reviewed.          Assessment:       Diagnosis Plan   1. Syncope, unspecified syncope type  Adult Transthoracic Echo Complete W/ Cont if Necessary Per Protocol   2. POTS (postural orthostatic tachycardia syndrome)       The patient has a multitude of symptoms that certainly could fall under the category of postural orthostatic tachycardia syndrome.  The gold standard test appears to be a tilt table examination which in itself has limitations.  I am not certain that the diagnosis exists at this point.  She has not had any response to the Mestinon nor the Florinef.  My suggestion is that we start with an echocardiogram to make sure there is no structural abnormalities.  If this is normal I will suggest taking her off both of the above medications and then performing the tilt table exam.  We would want her off medication to really prove the diagnosis.  Obesity: I discussed with the patient dietary maneuvers as well as exercise protocol.  I think this would go along way in helping her feel better if she were more physically active and also got her weight down to a much more ideal body weight.  3.  Attention deficit disorder: She continues on Adderall  4.  Chronic pain syndrome.  She is on gabapentin and reports off the medication she has significant neuropathic pain of her lower extremities  The patient also probably has fibromyalgia.    I reviewed the patient's laboratory work that has been performed and there is no particular abnormalities noted.  Plan:   I appreciate the opportunity to see this patient in consultation  Greater than 45 minutes was spent evaluating this patient with more than half during personal examination and interview.

## 2019-05-04 NOTE — PROGRESS NOTES
Subjective   Hilary Willams is a 38 y.o. female.   She is here to follow-up for myasthenia gravis along with postural orthostatic tachycardia syndrome and ADD  History of Present Illness   She is here to follow-up for myasthenia gravis which is still not stable along with postural orthostatic tachycardia syndrome which is not stable and ADD which is stable on Adderall 10 mg twice a day  The following portions of the patient's history were reviewed and updated as appropriate: allergies, current medications, past family history, past medical history, past social history, past surgical history and problem list.    Review of Systems   Constitutional: Positive for fatigue.   Respiratory: Negative for cough and shortness of breath.    Neurological: Positive for dizziness, weakness and light-headedness.   Psychiatric/Behavioral: Negative for dysphoric mood.   All other systems reviewed and are negative.      Objective   Physical Exam   Constitutional: She is oriented to person, place, and time. She appears well-developed and well-nourished. She is cooperative.   HENT:   Head: Normocephalic and atraumatic.   Right Ear: Hearing, tympanic membrane, external ear and ear canal normal.   Left Ear: Hearing, tympanic membrane, external ear and ear canal normal.   Nose: Nose normal.   Mouth/Throat: Uvula is midline, oropharynx is clear and moist and mucous membranes are normal.   Eyes: Conjunctivae, EOM and lids are normal. Pupils are equal, round, and reactive to light.   Neck: Phonation normal. Neck supple. Carotid bruit is not present.   Cardiovascular: Normal rate, regular rhythm and normal heart sounds. Exam reveals no gallop and no friction rub.   No murmur heard.  Pulmonary/Chest: Effort normal and breath sounds normal. No respiratory distress.   Abdominal: Soft. Bowel sounds are normal. She exhibits no distension and no mass. There is no hepatosplenomegaly. There is no tenderness. There is no rebound and no guarding. No  hernia.   Musculoskeletal: She exhibits no edema.   Neurological: She is alert and oriented to person, place, and time. Coordination and gait normal.   Skin: Skin is warm and dry.   Psychiatric: She has a normal mood and affect. Her speech is normal and behavior is normal. Judgment and thought content normal.   Nursing note and vitals reviewed.      Assessment/Plan   Diagnoses and all orders for this visit:    Myasthenia gravis (CMS/HCC)  -     Ambulatory Referral to Neurology    POTS (postural orthostatic tachycardia syndrome)  -     Tilt table    Attention deficit disorder (ADD) without hyperactivity    Other orders  -     amphetamine-dextroamphetamine (ADDERALL) 10 MG tablet; Take 1 tablet by mouth 2 (Two) Times a Day.        Myasthenia gravis not stable we will have her see neurology  Postural orthostatic tachycardia syndrome not stable and we will have her go through tilt table  ADD stable on Adderall 10 mg twice a day

## 2019-05-31 ENCOUNTER — OFFICE VISIT (OUTPATIENT)
Dept: NEUROLOGY | Facility: CLINIC | Age: 39
End: 2019-05-31

## 2019-05-31 ENCOUNTER — LAB (OUTPATIENT)
Dept: LAB | Facility: HOSPITAL | Age: 39
End: 2019-05-31

## 2019-05-31 VITALS
SYSTOLIC BLOOD PRESSURE: 120 MMHG | HEART RATE: 90 BPM | BODY MASS INDEX: 33.99 KG/M2 | DIASTOLIC BLOOD PRESSURE: 82 MMHG | OXYGEN SATURATION: 98 % | WEIGHT: 180 LBS | HEIGHT: 61 IN

## 2019-05-31 DIAGNOSIS — M79.10 MYALGIA: ICD-10-CM

## 2019-05-31 DIAGNOSIS — M79.7 CHRONIC FATIGUE SYNDROME WITH FIBROMYALGIA: ICD-10-CM

## 2019-05-31 DIAGNOSIS — G56.02 CARPAL TUNNEL SYNDROME OF LEFT WRIST: ICD-10-CM

## 2019-05-31 DIAGNOSIS — G93.32 CHRONIC FATIGUE SYNDROME WITH FIBROMYALGIA: ICD-10-CM

## 2019-05-31 DIAGNOSIS — M79.10 MYALGIA: Primary | ICD-10-CM

## 2019-05-31 LAB — CK SERPL-CCNC: 38 U/L (ref 20–180)

## 2019-05-31 PROCEDURE — 83519 RIA NONANTIBODY: CPT

## 2019-05-31 PROCEDURE — 82550 ASSAY OF CK (CPK): CPT

## 2019-05-31 PROCEDURE — 99244 OFF/OP CNSLTJ NEW/EST MOD 40: CPT | Performed by: PSYCHIATRY & NEUROLOGY

## 2019-05-31 PROCEDURE — 36415 COLL VENOUS BLD VENIPUNCTURE: CPT

## 2019-05-31 RX ORDER — CETIRIZINE HYDROCHLORIDE 10 MG/1
10 TABLET ORAL DAILY
COMMUNITY
End: 2020-02-19

## 2019-05-31 NOTE — PROGRESS NOTES
CC: Possible myasthenia gravis    HPI:  Hilary Willams is a  38 y.o.  right-handed white female who was sent for a neurological consultation by Dr. Martinez regarding possible myasthenia gravis.  Patient states that she has had many years of increasing fatigability and muscle pain.  She describes multiple additional symptoms such as numbness occurring at the tops of the thighs particularly when she stands.  Her legs will get weak when she sitting for prolonged periods of time and they may collapse.  She has had syncopal episodes since she was a child which she describes as becoming clammy and appearing pale to those around her followed by her mouth watering, nausea, tunnel hearing and visual blurring followed by loss of consciousness.  She knows if she develops some of the symptoms she needs to sit down or lay down quickly.  She was previously diagnosed with pots but current cardiologist is not convinced.    She specifically denies diplopia and ptosis and she basically does not have any speech or swallowing difficulty however she says that when she gets severely fatigued than her speech changes and she is confused.  Evaluation has included an MRI of the brain 3/20/2019 without and with contrast which was negative.  A chest CT 3/13/2019 was negative also.  Acetylcholine receptor antibodies done 8/30/2018 were negative for binding blocking and modulating antibodies.  Striated muscle antibody was negative.  TSH was 3.74 and free T4 was 1.19.  On 2/13/2019 she had an [facility] panel which was negative for anticentromere, anti-chromatin, anti-DNA antibodies as well as c-ANCA and p-ANCA antibodies and atypical P ANCA antibodies.  SSA, SSB antibodies negative, Carla-1 IgG negative and myeloperoxidase antibodies were negative.    She was placed on pyridostigmine which she takes 60 mg twice daily with perhaps some side effects at 3 times daily.  She says it helps her fatigue a lot.  At the dose she is on she has a little bit of  diarrhea which comes and goes but no excessive salivation or muscle twitches or muscle cramping.  Occasionally she has an eyelid which wants to cramp some.    She denies history of significant head trauma or spine trauma meningitis seizures stroke.  There is positive family history of stroke in the paternal grandfather and paternal uncles and possibly maternal grandmother.  No one with a brain tumor brain hemorrhage or aneurysm of the brain artery.    She states that her sister has Marion Danlos syndrome and myasthenia gravis.  She states her daughter has Marion Danlos syndrome.  She has history of fibromyalgia anxiety and panic attacks and depression        Past Medical History:   Diagnosis Date   • Anemia    • Difficulty walking    • Dysautonomia (CMS/HCC)    • Fatigue    • Fatty liver    • Fibromyalgia    • Hair loss    • Hiatal hernia    • Irritable bowel syndrome    • Lymphadenopathy    • Memory loss    • Peripheral neuropathy    • POTS (postural orthostatic tachycardia syndrome)    • Sleep apnea    • Vitamin D deficiency    • Weakness          Past Surgical History:   Procedure Laterality Date   • UPPER GASTROINTESTINAL ENDOSCOPY  01/21/2009               Current Outpatient Medications:   •  allopurinol (ZYLOPRIM) 100 MG tablet, Take 1 tablet by mouth Daily., Disp: 30 tablet, Rfl: 5  •  amphetamine-dextroamphetamine (ADDERALL) 10 MG tablet, Take 1 tablet by mouth 2 (Two) Times a Day., Disp: 60 tablet, Rfl: 0  •  cetirizine (zyrTEC) 10 MG tablet, Take 10 mg by mouth Daily., Disp: , Rfl:   •  cholecalciferol (VITAMIN D3) 1000 units tablet, Take 10,000 Units by mouth Daily., Disp: , Rfl:   •  gabapentin (NEURONTIN) 300 MG capsule, TAKE ONE CAPSULE BY MOUTH EVERY MORNING, THEN ONE CAPSULE IN THE EARLY AFTERNOON AND TWO CAPSULES AT BEDTIME (Patient taking differently: Take 300 mg by mouth 2 (Two) Times a Day. TAKE ONE CAPSULE BY MOUTH EVERY MORNING, THEN ONE CAPSULE IN THE EARLY AFTERNOON AND TWO CAPSULES AT  BEDTIME), Disp: 360 capsule, Rfl: 0  •  pyridostigmine (MESTINON) 60 MG tablet, TAKE ONE TABLET BY MOUTH THREE TIMES A DAY, Disp: 270 tablet, Rfl: 1  •  traMADol (ULTRAM) 50 MG tablet, Take 50 mg by mouth Every 8 (Eight) Hours As Needed., Disp: , Rfl:       Family History   Problem Relation Age of Onset   • Depression Mother    • Hypertension Mother    • Arthritis Father    • Depression Father    • Depression Sister    • Myasthenia gravis Sister    • Marion-Danlos syndrome Sister    • Stroke Maternal Uncle    • Alcohol abuse Maternal Uncle    • Cancer Paternal Aunt    • Thyroid disease Paternal Aunt    • Depression Paternal Aunt    • Heart disease Paternal Uncle    • Cancer Paternal Uncle         colon, pancreatic   • Depression Paternal Uncle    • Colon cancer Paternal Uncle    • Hypertension Maternal Grandmother    • Depression Maternal Grandmother    • Heart disease Maternal Grandmother    • Heart disease Maternal Grandfather    • Hypertension Maternal Grandfather    • Stroke Maternal Grandfather    • Heart disease Paternal Grandmother    • Hypertension Paternal Grandmother    • Cancer Paternal Grandmother         liver,bone   • Thyroid disease Paternal Grandmother    • Arthritis Paternal Grandmother    • Depression Paternal Grandmother    • Cancer Paternal Grandfather         lung   • Lung disease Paternal Grandfather    • Pancreatic cancer Paternal Grandfather    • Marion-Danlos syndrome Daughter          Social History     Socioeconomic History   • Marital status:      Spouse name: Not on file   • Number of children: Not on file   • Years of education: Not on file   • Highest education level: Not on file   Tobacco Use   • Smoking status: Never Smoker   • Smokeless tobacco: Never Used   • Tobacco comment: caffeine use   Substance and Sexual Activity   • Alcohol use: Yes     Comment: seldom   • Drug use: No   • Sexual activity: Defer         Allergies   Allergen Reactions   • Cymbalta [Duloxetine Hcl]  Unknown (See Comments)     unknown   • Penicillins Nausea Only         Pain Scale: 2/10        ROS:  Review of Systems   Constitutional: Positive for activity change, fatigue and fever. Negative for appetite change, chills, diaphoresis and unexpected weight change.   HENT: Positive for dental problem, facial swelling, sore throat, trouble swallowing and voice change. Negative for congestion, drooling, ear discharge, ear pain, hearing loss, mouth sores, nosebleeds, postnasal drip, rhinorrhea, sinus pressure, sinus pain, sneezing and tinnitus.    Eyes: Positive for visual disturbance. Negative for photophobia, pain, discharge, redness and itching.   Respiratory: Positive for apnea, cough, choking and shortness of breath. Negative for chest tightness, wheezing and stridor.    Cardiovascular: Negative.    Gastrointestinal: Negative.    Endocrine: Negative.    Genitourinary: Positive for menstrual problem. Negative for decreased urine volume, difficulty urinating, dyspareunia, dysuria, enuresis, flank pain, frequency, genital sores, hematuria, pelvic pain, urgency, vaginal bleeding, vaginal discharge and vaginal pain.   Musculoskeletal: Positive for arthralgias, gait problem, joint swelling, myalgias, neck pain and neck stiffness. Negative for back pain.   Skin: Positive for color change. Negative for pallor, rash and wound.   Allergic/Immunologic: Positive for environmental allergies. Negative for food allergies and immunocompromised state.   Neurological: Positive for dizziness, tremors, speech difficulty, weakness, light-headedness, numbness and headaches. Negative for seizures, syncope and facial asymmetry.   Hematological: Positive for adenopathy. Does not bruise/bleed easily.   Psychiatric/Behavioral: Positive for agitation, behavioral problems, confusion and decreased concentration. Negative for dysphoric mood, hallucinations, self-injury, sleep disturbance and suicidal ideas. The patient is nervous/anxious. The  "patient is not hyperactive.          I have review and agree with the above ROS completed by the medical assistant.    Physical Exam:  Vitals:    05/31/19 1254   BP: 120/82   Pulse: 90   SpO2: 98%   Weight: 81.6 kg (180 lb)   Height: 154.9 cm (61\")     Orthostatic BP: Supine blood pressure 140/80 with pulse 92; standing blood pressure 120/80 with heart rate 120  Body mass index is 34.01 kg/m².    Physical Exam  General: Obese white female no acute distress  HEENT: Normocephalic no evidence of trauma.  Discs flat.  No AV nicking.  Throat negative.  Neck: Supple.  No thyromegaly.  No cervical bruits.  Radial pulses strong and simultaneous  Heart: Regular rate and rhythm without murmurs  Extremities: No pedal edema      Neurological Exam:   Mental Status: Awake, alert, oriented to person, place and time.  Conversant without  evidence of an affective disorder, thought disorder, delusions or  hallucinations.  Attention span and concentration are normal.  HCF: No aphasia, apraxia or dysarthria.  Recent and remote memory intact.  Knowledge of recent events intact.  CN: I:   II: Visual fields full without left inattention   III, VI, VI: Eye movements intact without nystagmus or ptosis.  Pupils equal    round and reactive to light.   V,VII: Light touch and pinprick intact all 3 divisions of V.  Facial muscles  symmetrical.   VIII: Hearing intact to finger rub   IX,X: Soft palate elevates symmetrically   XI: Sternomastoid and trapezius are strong.   XII: Tongue midline without atrophy or fasciculations  Motor: Normal tone and bulk in the upper and lower extremities   Power testing: There is moderate weakness of the left abductor pollicis brevis with all other muscles tested in the arms to be normal.  She describes pain when kimberly muscles of the shoulder girdle especially.  In the lower extremities it requires some coaching that she has normal power in all muscles tested in the legs.  Reflexes: Upper extremities: " Diffusely +3.  Michelle's positive bilaterally        Lower extremities: Diffusely +3        Toe signs: Downgoing bilaterally   Jaw jerk is brisk.  Chvostek signs are positive bilaterally  Sensory: Light touch: Some reduction first 3 digits of both hands somewhat patchy more so on the left  reduced on the great toes             Pinprick: Some reduction on first 3 digits both hands but not as notable as the light touch.  Reduced on the great toes        Vibration: Normal at the ankles        Position: Normal at the great toes    Cerebellar: Finger-to-nose: Normal           Rapid movement: Normal           Heel-to-shin: Normal  Gait and Station: Casual, toe, heel and tandem walk normal no Romberg no drift    Results:      Lab Results   Component Value Date    BUN 9 04/03/2019    CREATININE 0.77 04/03/2019    EGFRIFNONA 84 04/03/2019    EGFRIFAFRI 102 04/03/2019    BCR 11.7 04/03/2019    CO2 24.3 04/03/2019    CALCIUM 9.8 04/03/2019    PROTENTOTREF 7.7 04/03/2019    ALBUMIN 4.90 04/03/2019    LABIL2 1.8 04/03/2019    AST 21 04/03/2019    ALT 36 (H) 04/03/2019       Lab Results   Component Value Date    WBC 8.50 11/02/2017    HGB 15.2 11/02/2017    HCT 45.0 11/02/2017    MCV 87.5 11/02/2017     11/02/2017         .No results found for: RPR      Lab Results   Component Value Date    TSH 1.420 04/03/2019    T9EYKSK 7.10 04/03/2019         Lab Results   Component Value Date    ZPAKUBUM66 594 11/02/2017         Lab Results   Component Value Date    FOLATE 10.41 08/07/2017         Lab Results   Component Value Date    HGBA1C 5.20 04/03/2019               Assessment:   1.  Possible myasthenia gravis-no objective findings on today's exam.  Her presentation does not include diplopia/ptosis which is 90% of cases.  Antibodies are negative x3.  Will need musk antibody  2.  Diffuse muscle pain-probable fibromyalgia-check CPK and an EMG  3.  Numbness on the thighs-distribution seems greater than expected for meralgia  paresthetica.  Origin is not clear.  She does not have motor weakness to suspect lumbar stenosis to be the origin      Plan:  1.  EMG left arm and leg to include in addition repetitive stimulation studies.  She was told to stop the Mestinon with at least 2 days prior to the study  2.  Check CPK and anti-musk antibody  3.  We will discuss results when I see her back for her EMG            Greater than 50% of this 60-minute consult was spent counseling the patient on the presentation of myasthenia gravis and the evaluation.              Dictated utilizing Dragon dictation.

## 2019-06-03 RX ORDER — DEXTROAMPHETAMINE SACCHARATE, AMPHETAMINE ASPARTATE, DEXTROAMPHETAMINE SULFATE AND AMPHETAMINE SULFATE 2.5; 2.5; 2.5; 2.5 MG/1; MG/1; MG/1; MG/1
10 TABLET ORAL 2 TIMES DAILY
Qty: 60 TABLET | Refills: 0 | Status: SHIPPED | OUTPATIENT
Start: 2019-06-03 | End: 2019-08-14 | Stop reason: SDUPTHER

## 2019-06-03 NOTE — TELEPHONE ENCOUNTER
Pt. Called regarding needing refill for adderall.   Speedy and contract UTD 6/3/19    Was noffsigner pt.

## 2019-06-05 ENCOUNTER — HOSPITAL ENCOUNTER (OUTPATIENT)
Dept: CARDIOLOGY | Facility: HOSPITAL | Age: 39
Discharge: HOME | End: 2019-06-05
Admitting: INTERNAL MEDICINE

## 2019-06-05 VITALS
WEIGHT: 180 LBS | SYSTOLIC BLOOD PRESSURE: 110 MMHG | HEART RATE: 95 BPM | HEIGHT: 61 IN | DIASTOLIC BLOOD PRESSURE: 80 MMHG | BODY MASS INDEX: 33.99 KG/M2

## 2019-06-05 DIAGNOSIS — R55 SYNCOPE, UNSPECIFIED SYNCOPE TYPE: ICD-10-CM

## 2019-06-05 LAB
ASCENDING AORTA: 2.4 CM
BH CV ECHO MEAS - ACS: 1.8 CM
BH CV ECHO MEAS - AO MAX PG (FULL): 1.3 MMHG
BH CV ECHO MEAS - AO MAX PG: 4.6 MMHG
BH CV ECHO MEAS - AO MEAN PG (FULL): 0.9 MMHG
BH CV ECHO MEAS - AO MEAN PG: 2.8 MMHG
BH CV ECHO MEAS - AO ROOT AREA (BSA CORRECTED): 1.5
BH CV ECHO MEAS - AO ROOT AREA: 5.7 CM^2
BH CV ECHO MEAS - AO ROOT DIAM: 2.7 CM
BH CV ECHO MEAS - AO V2 MAX: 107.2 CM/SEC
BH CV ECHO MEAS - AO V2 MEAN: 77.9 CM/SEC
BH CV ECHO MEAS - AO V2 VTI: 20 CM
BH CV ECHO MEAS - AVA(I,A): 2.3 CM^2
BH CV ECHO MEAS - AVA(I,D): 2.3 CM^2
BH CV ECHO MEAS - AVA(V,A): 2.4 CM^2
BH CV ECHO MEAS - AVA(V,D): 2.4 CM^2
BH CV ECHO MEAS - BSA(HAYCOCK): 1.9 M^2
BH CV ECHO MEAS - BSA: 1.8 M^2
BH CV ECHO MEAS - BZI_BMI: 34 KILOGRAMS/M^2
BH CV ECHO MEAS - BZI_METRIC_HEIGHT: 154.9 CM
BH CV ECHO MEAS - BZI_METRIC_WEIGHT: 81.6 KG
BH CV ECHO MEAS - EDV(MOD-SP2): 41 ML
BH CV ECHO MEAS - EDV(MOD-SP4): 38 ML
BH CV ECHO MEAS - EDV(TEICH): 57.7 ML
BH CV ECHO MEAS - EF(CUBED): 81.2 %
BH CV ECHO MEAS - EF(MOD-BP): 58 %
BH CV ECHO MEAS - EF(MOD-SP2): 56.1 %
BH CV ECHO MEAS - EF(MOD-SP4): 57.9 %
BH CV ECHO MEAS - EF(TEICH): 74.6 %
BH CV ECHO MEAS - ESV(MOD-SP2): 18 ML
BH CV ECHO MEAS - ESV(MOD-SP4): 16 ML
BH CV ECHO MEAS - ESV(TEICH): 14.6 ML
BH CV ECHO MEAS - FS: 42.7 %
BH CV ECHO MEAS - IVS/LVPW: 1
BH CV ECHO MEAS - IVSD: 0.96 CM
BH CV ECHO MEAS - LAT PEAK E' VEL: 14 CM/SEC
BH CV ECHO MEAS - LV DIASTOLIC VOL/BSA (35-75): 21 ML/M^2
BH CV ECHO MEAS - LV MASS(C)D: 102.6 GRAMS
BH CV ECHO MEAS - LV MASS(C)DI: 56.8 GRAMS/M^2
BH CV ECHO MEAS - LV MAX PG: 3.3 MMHG
BH CV ECHO MEAS - LV MEAN PG: 1.9 MMHG
BH CV ECHO MEAS - LV SYSTOLIC VOL/BSA (12-30): 8.9 ML/M^2
BH CV ECHO MEAS - LV V1 MAX: 90.7 CM/SEC
BH CV ECHO MEAS - LV V1 MEAN: 64.6 CM/SEC
BH CV ECHO MEAS - LV V1 VTI: 16.1 CM
BH CV ECHO MEAS - LVIDD: 3.7 CM
BH CV ECHO MEAS - LVIDS: 2.1 CM
BH CV ECHO MEAS - LVLD AP2: 6.7 CM
BH CV ECHO MEAS - LVLD AP4: 6.7 CM
BH CV ECHO MEAS - LVLS AP2: 5.6 CM
BH CV ECHO MEAS - LVLS AP4: 5.6 CM
BH CV ECHO MEAS - LVOT AREA (M): 2.8 CM^2
BH CV ECHO MEAS - LVOT AREA: 2.8 CM^2
BH CV ECHO MEAS - LVOT DIAM: 1.9 CM
BH CV ECHO MEAS - LVPWD: 0.92 CM
BH CV ECHO MEAS - MED PEAK E' VEL: 11 CM/SEC
BH CV ECHO MEAS - MV A DUR: 0.08 SEC
BH CV ECHO MEAS - MV A MAX VEL: 80.4 CM/SEC
BH CV ECHO MEAS - MV DEC SLOPE: 522.6 CM/SEC^2
BH CV ECHO MEAS - MV DEC TIME: 0.14 SEC
BH CV ECHO MEAS - MV E MAX VEL: 95.7 CM/SEC
BH CV ECHO MEAS - MV E/A: 1.2
BH CV ECHO MEAS - MV MAX PG: 3.5 MMHG
BH CV ECHO MEAS - MV MEAN PG: 2.2 MMHG
BH CV ECHO MEAS - MV P1/2T MAX VEL: 85.5 CM/SEC
BH CV ECHO MEAS - MV P1/2T: 47.9 MSEC
BH CV ECHO MEAS - MV V2 MAX: 93.7 CM/SEC
BH CV ECHO MEAS - MV V2 MEAN: 68.9 CM/SEC
BH CV ECHO MEAS - MV V2 VTI: 17.1 CM
BH CV ECHO MEAS - MVA P1/2T LCG: 2.6 CM^2
BH CV ECHO MEAS - MVA(P1/2T): 4.6 CM^2
BH CV ECHO MEAS - MVA(VTI): 2.7 CM^2
BH CV ECHO MEAS - PA MAX PG (FULL): 5.6 MMHG
BH CV ECHO MEAS - PA MAX PG: 7.5 MMHG
BH CV ECHO MEAS - PA V2 MAX: 137.1 CM/SEC
BH CV ECHO MEAS - PULM A REVS DUR: 0.09 SEC
BH CV ECHO MEAS - PULM A REVS VEL: 56.9 CM/SEC
BH CV ECHO MEAS - PULM DIAS VEL: 47.6 CM/SEC
BH CV ECHO MEAS - PULM S/D: 1.3
BH CV ECHO MEAS - PULM SYS VEL: 63.4 CM/SEC
BH CV ECHO MEAS - PVA(V,A): 1.8 CM^2
BH CV ECHO MEAS - PVA(V,D): 1.8 CM^2
BH CV ECHO MEAS - QP/QS: 1.2
BH CV ECHO MEAS - RV MAX PG: 2 MMHG
BH CV ECHO MEAS - RV MEAN PG: 1.3 MMHG
BH CV ECHO MEAS - RV V1 MAX: 69.8 CM/SEC
BH CV ECHO MEAS - RV V1 MEAN: 54.1 CM/SEC
BH CV ECHO MEAS - RV V1 VTI: 14.7 CM
BH CV ECHO MEAS - RVOT AREA: 3.6 CM^2
BH CV ECHO MEAS - RVOT DIAM: 2.1 CM
BH CV ECHO MEAS - SI(AO): 63.7 ML/M^2
BH CV ECHO MEAS - SI(CUBED): 22.5 ML/M^2
BH CV ECHO MEAS - SI(LVOT): 25.2 ML/M^2
BH CV ECHO MEAS - SI(MOD-SP2): 12.7 ML/M^2
BH CV ECHO MEAS - SI(MOD-SP4): 12.2 ML/M^2
BH CV ECHO MEAS - SI(TEICH): 23.8 ML/M^2
BH CV ECHO MEAS - SUP REN AO DIAM: 1.5 CM
BH CV ECHO MEAS - SV(AO): 115.1 ML
BH CV ECHO MEAS - SV(CUBED): 40.7 ML
BH CV ECHO MEAS - SV(LVOT): 45.5 ML
BH CV ECHO MEAS - SV(MOD-SP2): 23 ML
BH CV ECHO MEAS - SV(MOD-SP4): 22 ML
BH CV ECHO MEAS - SV(RVOT): 52.7 ML
BH CV ECHO MEAS - SV(TEICH): 43 ML
BH CV ECHO MEAS - TAPSE (>1.6): 1.5 CM2
BH CV ECHO MEASUREMENTS AVERAGE E/E' RATIO: 7.66
BH CV XLRA - RV BASE: 2.4 CM
BH CV XLRA - TDI S': 14 CM/SEC
LEFT ATRIUM VOLUME INDEX: 14 ML/M2
LEFT ATRIUM VOLUME: 25 CM3
LV EF 2D ECHO EST: 58 %
MAXIMAL PREDICTED HEART RATE: 183 BPM
SINUS: 2.3 CM
STJ: 2.4 CM
STRESS TARGET HR: 156 BPM

## 2019-06-05 PROCEDURE — 93306 TTE W/DOPPLER COMPLETE: CPT

## 2019-06-05 PROCEDURE — 93306 TTE W/DOPPLER COMPLETE: CPT | Performed by: INTERNAL MEDICINE

## 2019-06-07 LAB — MUSK ANTIBODY: <1 U/ML

## 2019-07-26 ENCOUNTER — OFFICE VISIT (OUTPATIENT)
Dept: FAMILY MEDICINE CLINIC | Facility: CLINIC | Age: 39
End: 2019-07-26

## 2019-07-26 VITALS
TEMPERATURE: 99 F | OXYGEN SATURATION: 97 % | RESPIRATION RATE: 18 BRPM | BODY MASS INDEX: 34.55 KG/M2 | WEIGHT: 183 LBS | SYSTOLIC BLOOD PRESSURE: 108 MMHG | HEART RATE: 98 BPM | DIASTOLIC BLOOD PRESSURE: 70 MMHG | HEIGHT: 61 IN

## 2019-07-26 DIAGNOSIS — K75.81 NASH (NONALCOHOLIC STEATOHEPATITIS): ICD-10-CM

## 2019-07-26 DIAGNOSIS — G47.429 NARCOLEPSY DUE TO UNDERLYING CONDITION WITHOUT CATAPLEXY: ICD-10-CM

## 2019-07-26 DIAGNOSIS — F98.8 ATTENTION DEFICIT DISORDER (ADD) WITHOUT HYPERACTIVITY: ICD-10-CM

## 2019-07-26 DIAGNOSIS — M79.7 FIBROMYALGIA: ICD-10-CM

## 2019-07-26 DIAGNOSIS — G90.A POTS (POSTURAL ORTHOSTATIC TACHYCARDIA SYNDROME): ICD-10-CM

## 2019-07-26 DIAGNOSIS — Z00.00 PHYSICAL EXAM, ANNUAL: Primary | ICD-10-CM

## 2019-07-26 DIAGNOSIS — E79.0 HYPERURICEMIA: ICD-10-CM

## 2019-07-26 PROCEDURE — 99395 PREV VISIT EST AGE 18-39: CPT | Performed by: INTERNAL MEDICINE

## 2019-07-26 NOTE — PROGRESS NOTES
Subjective   Hilary Willams is a 39 y.o. female.     History of Present Illness   Patient was seen for a physical.  Patient's diet and physical activity were discussed.  Patient was advised to see a physical therapist for the fibromyalgia and increased muscle tone.    Dictated utilizing Dragon dictation. If there are questions or for further clarification, please contact me.  The following portions of the patient's history were reviewed and updated as appropriate: allergies, current medications, past family history, past medical history, past social history, past surgical history and problem list.    Review of Systems   Constitutional: Negative for fatigue and fever.   HENT: Positive for congestion. Negative for trouble swallowing.    Eyes: Negative for discharge and visual disturbance.   Respiratory: Negative for choking and shortness of breath.    Cardiovascular: Negative for chest pain and palpitations.   Gastrointestinal: Negative for abdominal pain and blood in stool.   Endocrine: Negative.    Genitourinary: Negative for genital sores and hematuria.   Musculoskeletal: Negative for gait problem and joint swelling.   Skin: Negative for color change, pallor, rash and wound.   Allergic/Immunologic: Positive for environmental allergies. Negative for immunocompromised state.   Neurological: Positive for weakness. Negative for facial asymmetry and speech difficulty.   Psychiatric/Behavioral: Negative for hallucinations and suicidal ideas.       Objective   Physical Exam   Constitutional: She is oriented to person, place, and time. She appears well-developed and well-nourished. No distress.   HENT:   Head: Normocephalic and atraumatic.   Right Ear: External ear normal.   Left Ear: External ear normal.   Nose: Nose normal.   Mouth/Throat: Oropharynx is clear and moist. No oropharyngeal exudate.   Eyes: Conjunctivae and EOM are normal. Pupils are equal, round, and reactive to light. Right eye exhibits no discharge.  Left eye exhibits no discharge. No scleral icterus.   Neck: Normal range of motion. Neck supple. No JVD present. No tracheal deviation present. No thyromegaly present.   Cardiovascular: Normal rate, regular rhythm and normal heart sounds. Exam reveals no gallop and no friction rub.   No murmur heard.  Pulmonary/Chest: Effort normal and breath sounds normal. No stridor. No respiratory distress. She has no wheezes. She has no rales. She exhibits no tenderness.   Abdominal: Soft. Bowel sounds are normal. She exhibits no distension and no mass. There is no tenderness. There is no rebound and no guarding. No hernia.   Musculoskeletal: Normal range of motion. She exhibits edema and tenderness. She exhibits no deformity.   Lymphadenopathy:     She has no cervical adenopathy.   Neurological: She is alert and oriented to person, place, and time. She displays normal reflexes. No sensory deficit. She exhibits abnormal muscle tone. Coordination normal.   Skin: Skin is warm and dry. She is not diaphoretic.   Psychiatric: She has a normal mood and affect. Her behavior is normal. Judgment and thought content normal.   Nursing note and vitals reviewed.      Assessment/Plan   Problems Addressed this Visit        Cardiovascular and Mediastinum    POTS (postural orthostatic tachycardia syndrome)    Relevant Orders    CBC (No Diff)    Comprehensive Metabolic Panel    Lipid Panel    Vitamin D 25 Hydroxy    Ferritin    Uric Acid       Digestive    BRENNER (nonalcoholic steatohepatitis)    Relevant Orders    CBC (No Diff)    Comprehensive Metabolic Panel    Lipid Panel    Vitamin D 25 Hydroxy    Ferritin    Uric Acid       Nervous and Auditory    Fibromyalgia    Relevant Orders    Ambulatory Referral to Physical Therapy Evaluate and treat, Ortho       Other    Narcolepsy    Hyperuricemia    Relevant Orders    CBC (No Diff)    Comprehensive Metabolic Panel    Lipid Panel    Vitamin D 25 Hydroxy    Ferritin    Uric Acid    Attention deficit  disorder (ADD) without hyperactivity      Other Visit Diagnoses     Physical exam, annual    -  Primary

## 2019-08-07 ENCOUNTER — LAB (OUTPATIENT)
Dept: FAMILY MEDICINE CLINIC | Facility: CLINIC | Age: 39
End: 2019-08-07

## 2019-08-07 DIAGNOSIS — G90.A POTS (POSTURAL ORTHOSTATIC TACHYCARDIA SYNDROME): ICD-10-CM

## 2019-08-07 DIAGNOSIS — E79.0 HYPERURICEMIA: ICD-10-CM

## 2019-08-07 DIAGNOSIS — K75.81 NASH (NONALCOHOLIC STEATOHEPATITIS): ICD-10-CM

## 2019-08-07 LAB
25(OH)D3 SERPL-MCNC: 93.6 NG/ML
ALBUMIN SERPL-MCNC: 4.6 G/DL (ref 3.5–5.2)
ALBUMIN/GLOB SERPL: 1.5 G/DL
ALP SERPL-CCNC: 43 U/L (ref 39–117)
ALT SERPL W P-5'-P-CCNC: 34 U/L (ref 1–33)
ANION GAP SERPL CALCULATED.3IONS-SCNC: 10.7 MMOL/L (ref 5–15)
AST SERPL-CCNC: 24 U/L (ref 1–32)
BILIRUB SERPL-MCNC: 0.4 MG/DL (ref 0.2–1.2)
BUN BLD-MCNC: 8 MG/DL (ref 6–20)
BUN/CREAT SERPL: 11.4 (ref 7–25)
CALCIUM SPEC-SCNC: 9.4 MG/DL (ref 8.6–10.5)
CHLORIDE SERPL-SCNC: 102 MMOL/L (ref 98–107)
CHOLEST SERPL-MCNC: 172 MG/DL (ref 0–200)
CO2 SERPL-SCNC: 25.3 MMOL/L (ref 22–29)
CREAT BLD-MCNC: 0.7 MG/DL (ref 0.57–1)
DEPRECATED RDW RBC AUTO: 39.1 FL (ref 37–54)
ERYTHROCYTE [DISTWIDTH] IN BLOOD BY AUTOMATED COUNT: 12 % (ref 12.3–15.4)
FERRITIN SERPL-MCNC: 78.2 NG/ML (ref 13–150)
GFR SERPL CREATININE-BSD FRML MDRD: 94 ML/MIN/1.73
GLOBULIN UR ELPH-MCNC: 3.1 GM/DL
GLUCOSE BLD-MCNC: 112 MG/DL (ref 65–99)
HCT VFR BLD AUTO: 46 % (ref 34–46.6)
HDLC SERPL-MCNC: 42 MG/DL (ref 40–60)
HGB BLD-MCNC: 15 G/DL (ref 12–15.9)
LDLC SERPL CALC-MCNC: 111 MG/DL (ref 0–100)
LDLC/HDLC SERPL: 2.64 {RATIO}
MCH RBC QN AUTO: 28.8 PG (ref 26.6–33)
MCHC RBC AUTO-ENTMCNC: 32.6 G/DL (ref 31.5–35.7)
MCV RBC AUTO: 88.3 FL (ref 79–97)
PLATELET # BLD AUTO: 365 10*3/MM3 (ref 140–450)
PMV BLD AUTO: 10.3 FL (ref 6–12)
POTASSIUM BLD-SCNC: 4.3 MMOL/L (ref 3.5–5.2)
PROT SERPL-MCNC: 7.7 G/DL (ref 6–8.5)
RBC # BLD AUTO: 5.21 10*6/MM3 (ref 3.77–5.28)
SODIUM BLD-SCNC: 138 MMOL/L (ref 136–145)
TRIGL SERPL-MCNC: 95 MG/DL (ref 0–150)
URATE SERPL-MCNC: 6 MG/DL (ref 2.4–5.7)
VLDLC SERPL-MCNC: 19 MG/DL (ref 5–40)
WBC NRBC COR # BLD: 6.28 10*3/MM3 (ref 3.4–10.8)

## 2019-08-07 PROCEDURE — 85027 COMPLETE CBC AUTOMATED: CPT | Performed by: INTERNAL MEDICINE

## 2019-08-07 PROCEDURE — 82728 ASSAY OF FERRITIN: CPT | Performed by: INTERNAL MEDICINE

## 2019-08-07 PROCEDURE — 36415 COLL VENOUS BLD VENIPUNCTURE: CPT | Performed by: INTERNAL MEDICINE

## 2019-08-07 PROCEDURE — 80061 LIPID PANEL: CPT | Performed by: INTERNAL MEDICINE

## 2019-08-07 PROCEDURE — 84550 ASSAY OF BLOOD/URIC ACID: CPT | Performed by: INTERNAL MEDICINE

## 2019-08-07 PROCEDURE — 82306 VITAMIN D 25 HYDROXY: CPT | Performed by: INTERNAL MEDICINE

## 2019-08-07 PROCEDURE — 80053 COMPREHEN METABOLIC PANEL: CPT | Performed by: INTERNAL MEDICINE

## 2019-08-14 ENCOUNTER — OFFICE VISIT (OUTPATIENT)
Dept: FAMILY MEDICINE CLINIC | Facility: CLINIC | Age: 39
End: 2019-08-14

## 2019-08-14 VITALS
WEIGHT: 185 LBS | OXYGEN SATURATION: 99 % | DIASTOLIC BLOOD PRESSURE: 82 MMHG | SYSTOLIC BLOOD PRESSURE: 104 MMHG | BODY MASS INDEX: 34.93 KG/M2 | HEART RATE: 106 BPM | HEIGHT: 61 IN | TEMPERATURE: 99 F

## 2019-08-14 DIAGNOSIS — N83.00 FOLLICLE CYST: Primary | ICD-10-CM

## 2019-08-14 PROCEDURE — 99213 OFFICE O/P EST LOW 20 MIN: CPT | Performed by: INTERNAL MEDICINE

## 2019-08-14 RX ORDER — DOXYCYCLINE HYCLATE 100 MG
100 TABLET ORAL 2 TIMES DAILY
Qty: 28 TABLET | Refills: 0 | Status: SHIPPED | OUTPATIENT
Start: 2019-08-14 | End: 2020-01-03 | Stop reason: SDUPTHER

## 2019-08-14 RX ORDER — ALLOPURINOL 100 MG/1
100 TABLET ORAL DAILY
Qty: 30 TABLET | Refills: 5 | Status: SHIPPED | OUTPATIENT
Start: 2019-08-14 | End: 2021-07-29

## 2019-08-14 RX ORDER — DEXTROAMPHETAMINE SACCHARATE, AMPHETAMINE ASPARTATE, DEXTROAMPHETAMINE SULFATE AND AMPHETAMINE SULFATE 2.5; 2.5; 2.5; 2.5 MG/1; MG/1; MG/1; MG/1
10 TABLET ORAL 2 TIMES DAILY
Qty: 60 TABLET | Refills: 0 | Status: SHIPPED | OUTPATIENT
Start: 2019-08-14 | End: 2020-04-20 | Stop reason: SDUPTHER

## 2019-08-14 NOTE — PROGRESS NOTES
Subjective   Hilary Willams is a 39 y.o. female.     History of Present Illness   Patient was seen for a cyst on her left cheek.  Patient was given doxycycline and asked to follow-up with a dermatologist if not better.    Dictated utilizing Dragon dictation. If there are questions or for further clarification, please contact me.  The following portions of the patient's history were reviewed and updated as appropriate: allergies, current medications, past family history, past medical history, past social history, past surgical history and problem list.    Review of Systems   Constitutional: Negative for fatigue and fever.   HENT: Positive for congestion. Negative for trouble swallowing.    Eyes: Negative for discharge and visual disturbance.   Respiratory: Negative for choking and shortness of breath.    Cardiovascular: Negative for chest pain and palpitations.   Gastrointestinal: Negative for abdominal pain and blood in stool.   Endocrine: Negative.    Genitourinary: Negative for genital sores and hematuria.   Musculoskeletal: Negative for gait problem and joint swelling.   Skin: Negative for color change, pallor, rash and wound.        Facial cyst   Allergic/Immunologic: Positive for environmental allergies. Negative for immunocompromised state.   Neurological: Negative for facial asymmetry and speech difficulty.   Psychiatric/Behavioral: Negative for hallucinations and suicidal ideas.       Objective   Physical Exam   Constitutional: She is oriented to person, place, and time. She appears well-developed and well-nourished.   HENT:   Head: Normocephalic.   Eyes: Conjunctivae are normal. Pupils are equal, round, and reactive to light.   Neck: Normal range of motion. Neck supple.   Cardiovascular: Normal rate, regular rhythm and normal heart sounds.   Pulmonary/Chest: Effort normal and breath sounds normal.   Abdominal: Soft. Bowel sounds are normal.   Musculoskeletal: Normal range of motion.   Neurological: She  is alert and oriented to person, place, and time.   Skin: Skin is warm and dry.   Facial cyst   Psychiatric: She has a normal mood and affect. Her behavior is normal. Judgment and thought content normal.   Nursing note and vitals reviewed.      Assessment/Plan   Problems Addressed this Visit     None      Visit Diagnoses     Follicle cyst    -  Primary

## 2019-08-19 ENCOUNTER — PROCEDURE VISIT (OUTPATIENT)
Dept: NEUROLOGY | Facility: CLINIC | Age: 39
End: 2019-08-19

## 2019-08-19 VITALS
OXYGEN SATURATION: 99 % | BODY MASS INDEX: 33.61 KG/M2 | SYSTOLIC BLOOD PRESSURE: 130 MMHG | HEART RATE: 96 BPM | WEIGHT: 178 LBS | DIASTOLIC BLOOD PRESSURE: 98 MMHG | HEIGHT: 61 IN

## 2019-08-19 DIAGNOSIS — G56.02 CARPAL TUNNEL SYNDROME OF LEFT WRIST: ICD-10-CM

## 2019-08-19 DIAGNOSIS — G47.61 PERIODIC LIMB MOVEMENTS OF SLEEP: Primary | ICD-10-CM

## 2019-08-19 DIAGNOSIS — G93.32 CHRONIC FATIGUE SYNDROME WITH FIBROMYALGIA: ICD-10-CM

## 2019-08-19 DIAGNOSIS — R53.82 CHRONIC FATIGUE: ICD-10-CM

## 2019-08-19 DIAGNOSIS — M79.7 CHRONIC FATIGUE SYNDROME WITH FIBROMYALGIA: ICD-10-CM

## 2019-08-19 PROCEDURE — 95937 NEUROMUSCULAR JUNCTION TEST: CPT | Performed by: PSYCHIATRY & NEUROLOGY

## 2019-08-19 PROCEDURE — 95886 MUSC TEST DONE W/N TEST COMP: CPT | Performed by: PSYCHIATRY & NEUROLOGY

## 2019-08-19 PROCEDURE — 95912 NRV CNDJ TEST 11-12 STUDIES: CPT | Performed by: PSYCHIATRY & NEUROLOGY

## 2020-01-03 ENCOUNTER — OFFICE VISIT (OUTPATIENT)
Dept: FAMILY MEDICINE CLINIC | Facility: CLINIC | Age: 40
End: 2020-01-03

## 2020-01-03 VITALS
DIASTOLIC BLOOD PRESSURE: 60 MMHG | HEIGHT: 61 IN | SYSTOLIC BLOOD PRESSURE: 110 MMHG | OXYGEN SATURATION: 98 % | BODY MASS INDEX: 35.5 KG/M2 | TEMPERATURE: 98.3 F | WEIGHT: 188 LBS | HEART RATE: 91 BPM

## 2020-01-03 DIAGNOSIS — R06.02 SOB (SHORTNESS OF BREATH) ON EXERTION: ICD-10-CM

## 2020-01-03 DIAGNOSIS — R39.9 UTI SYMPTOMS: Primary | ICD-10-CM

## 2020-01-03 DIAGNOSIS — R30.0 DYSURIA: ICD-10-CM

## 2020-01-03 DIAGNOSIS — R05.9 COUGH: ICD-10-CM

## 2020-01-03 DIAGNOSIS — J98.6 ELEVATED HEMIDIAPHRAGM: ICD-10-CM

## 2020-01-03 LAB
ALBUMIN SERPL-MCNC: 4.3 G/DL (ref 3.5–5.2)
ALBUMIN/GLOB SERPL: 1.3 G/DL
ALP SERPL-CCNC: 44 U/L (ref 39–117)
ALT SERPL W P-5'-P-CCNC: 53 U/L (ref 1–33)
ANION GAP SERPL CALCULATED.3IONS-SCNC: 13.3 MMOL/L (ref 5–15)
AST SERPL-CCNC: 32 U/L (ref 1–32)
BACTERIA UR QL AUTO: NORMAL /HPF
BILIRUB SERPL-MCNC: 0.3 MG/DL (ref 0.2–1.2)
BILIRUB UR QL STRIP: NEGATIVE
BUN BLD-MCNC: 8 MG/DL (ref 6–20)
BUN/CREAT SERPL: 10.4 (ref 7–25)
CALCIUM SPEC-SCNC: 9.1 MG/DL (ref 8.6–10.5)
CHLORIDE SERPL-SCNC: 100 MMOL/L (ref 98–107)
CLARITY UR: CLEAR
CO2 SERPL-SCNC: 21.7 MMOL/L (ref 22–29)
COLOR UR: YELLOW
CREAT BLD-MCNC: 0.77 MG/DL (ref 0.57–1)
ERYTHROCYTE [DISTWIDTH] IN BLOOD BY AUTOMATED COUNT: 12.4 % (ref 12.3–15.4)
GFR SERPL CREATININE-BSD FRML MDRD: 84 ML/MIN/1.73
GLOBULIN UR ELPH-MCNC: 3.4 GM/DL
GLUCOSE BLD-MCNC: 110 MG/DL (ref 65–99)
GLUCOSE UR STRIP-MCNC: NEGATIVE MG/DL
HCT VFR BLD AUTO: 45.7 % (ref 34–46.6)
HGB BLD-MCNC: 15.2 G/DL (ref 12–15.9)
HGB UR QL STRIP.AUTO: NEGATIVE
KETONES UR QL STRIP: NEGATIVE
LEUKOCYTE ESTERASE UR QL STRIP.AUTO: NEGATIVE
LYMPHOCYTES # BLD AUTO: 2.1 10*3/MM3 (ref 0.7–3.1)
LYMPHOCYTES NFR BLD AUTO: 34.6 % (ref 19.6–45.3)
MCH RBC QN AUTO: 28.9 PG (ref 26.6–33)
MCHC RBC AUTO-ENTMCNC: 33.2 G/DL (ref 31.5–35.7)
MCV RBC AUTO: 86.9 FL (ref 79–97)
MONOCYTES # BLD AUTO: 0.4 10*3/MM3 (ref 0.1–0.9)
MONOCYTES NFR BLD AUTO: 6.5 % (ref 5–12)
NEUTROPHILS # BLD AUTO: 3.7 10*3/MM3 (ref 1.7–7)
NEUTROPHILS NFR BLD AUTO: 58.9 % (ref 42.7–76)
NITRITE UR QL STRIP: NEGATIVE
NT-PROBNP SERPL-MCNC: 21.7 PG/ML (ref 5–450)
PH UR STRIP.AUTO: <=5 [PH] (ref 4.6–8)
PLATELET # BLD AUTO: 327 10*3/MM3 (ref 140–450)
PMV BLD AUTO: 7.5 FL (ref 6–12)
POTASSIUM BLD-SCNC: 4.2 MMOL/L (ref 3.5–5.2)
PROT SERPL-MCNC: 7.7 G/DL (ref 6–8.5)
PROT UR QL STRIP: ABNORMAL
RBC # BLD AUTO: 5.27 10*6/MM3 (ref 3.77–5.28)
RBC # UR: NORMAL /HPF
REF LAB TEST METHOD: NORMAL
SODIUM BLD-SCNC: 135 MMOL/L (ref 136–145)
SP GR UR STRIP: >=1.03 (ref 1–1.03)
SQUAMOUS #/AREA URNS HPF: NORMAL /HPF
UROBILINOGEN UR QL STRIP: ABNORMAL
WBC NRBC COR # BLD: 6.2 10*3/MM3 (ref 3.4–10.8)
WBC UR QL AUTO: NORMAL /HPF

## 2020-01-03 PROCEDURE — 85025 COMPLETE CBC W/AUTO DIFF WBC: CPT | Performed by: NURSE PRACTITIONER

## 2020-01-03 PROCEDURE — 81001 URINALYSIS AUTO W/SCOPE: CPT | Performed by: NURSE PRACTITIONER

## 2020-01-03 PROCEDURE — 83880 ASSAY OF NATRIURETIC PEPTIDE: CPT | Performed by: NURSE PRACTITIONER

## 2020-01-03 PROCEDURE — 87086 URINE CULTURE/COLONY COUNT: CPT | Performed by: NURSE PRACTITIONER

## 2020-01-03 PROCEDURE — 71046 X-RAY EXAM CHEST 2 VIEWS: CPT | Performed by: NURSE PRACTITIONER

## 2020-01-03 PROCEDURE — 99214 OFFICE O/P EST MOD 30 MIN: CPT | Performed by: NURSE PRACTITIONER

## 2020-01-03 PROCEDURE — 80053 COMPREHEN METABOLIC PANEL: CPT | Performed by: NURSE PRACTITIONER

## 2020-01-03 PROCEDURE — 36415 COLL VENOUS BLD VENIPUNCTURE: CPT | Performed by: NURSE PRACTITIONER

## 2020-01-03 RX ORDER — AZITHROMYCIN 250 MG/1
TABLET, FILM COATED ORAL
Qty: 6 TABLET | Refills: 0 | Status: SHIPPED | OUTPATIENT
Start: 2020-01-03 | End: 2020-01-03

## 2020-01-03 RX ORDER — ALBUTEROL SULFATE 90 UG/1
2 AEROSOL, METERED RESPIRATORY (INHALATION) EVERY 4 HOURS PRN
Qty: 1 INHALER | Refills: 1 | Status: SHIPPED | OUTPATIENT
Start: 2020-01-03 | End: 2020-03-03

## 2020-01-03 RX ORDER — DOXYCYCLINE HYCLATE 100 MG
100 TABLET ORAL 2 TIMES DAILY
Qty: 14 TABLET | Refills: 0 | Status: SHIPPED | OUTPATIENT
Start: 2020-01-03 | End: 2020-02-19

## 2020-01-03 NOTE — PATIENT INSTRUCTIONS
Labs today will call with results.   Start doxycycline 100mg bid for 7 days,   Albuterol inhaler 2 puffs every 4 hours as needed for wheezing or SOA, educated about use,   cxr today will call with results. CT chest ordered.   Deferred ekg today,   If symptoms persist 5-7 days call office   If any worsening symptoms, SOA, CP, advised ER.   Increase fluid intake, get plenty of rest.   Patient agrees with plan of care and understands instructions. Call if worsening symptoms or any problems or concerns.

## 2020-01-03 NOTE — PROGRESS NOTES
Subjective   Hilary Willams is a 39 y.o. female.     History of Present Illness   C/o dysuria, urinary odor, she states symptoms started about 2 days ago, denies fever, she denies itching, does have some low back pain, denies hematuria, she denies frequency, denies d/c.   Also c/o SOA, states started about 1 week ago, She was walking in grocery, noticed increased breathing, states states resolved with rest, worse with activity, she has chest congestion, does have dry cough, she denies chest pain. She did notice wheezing. She has hx of POTS, hx of autoimmune, possible fibromyalgia, she denies hx of asthma. She has had fatigue, but also has chronic fatigue.        The following portions of the patient's history were reviewed and updated as appropriate: allergies, current medications, past family history, past medical history, past social history, past surgical history and problem list.    Review of Systems   Constitutional: Negative for chills, diaphoresis and fever.   HENT: Positive for congestion, ear pain and rhinorrhea. Negative for postnasal drip, sinus pressure and sore throat.    Respiratory: Positive for cough, shortness of breath and wheezing.    Cardiovascular: Negative for chest pain.   Genitourinary: Positive for dysuria. Negative for frequency, hematuria, pelvic pressure, urinary incontinence, vaginal bleeding and vaginal discharge.   Musculoskeletal: Negative for arthralgias and myalgias.   Neurological: Negative for dizziness, light-headedness and headache.   All other systems reviewed and are negative.      Objective   Physical Exam   Constitutional: She is oriented to person, place, and time. She appears well-developed and well-nourished.   HENT:   Head: Normocephalic.   Right Ear: Tympanic membrane and ear canal normal.   Left Ear: Tympanic membrane and ear canal normal.   Mouth/Throat: Uvula is midline, oropharynx is clear and moist and mucous membranes are normal.   Eyes: Pupils are equal,  round, and reactive to light.   Neck: Normal range of motion.   Cardiovascular: Normal rate, regular rhythm and normal heart sounds.   Pulses:       Radial pulses are 2+ on the right side, and 2+ on the left side.        Dorsalis pedis pulses are 2+ on the right side, and 2+ on the left side.        Posterior tibial pulses are 2+ on the right side, and 2+ on the left side.   Pulmonary/Chest: Effort normal and breath sounds normal. No respiratory distress. She has no decreased breath sounds. She has no wheezes. She has no rhonchi. She has no rales.   Abdominal: There is no CVA tenderness.   Musculoskeletal: Normal range of motion.   Lymphadenopathy:     She has no cervical adenopathy.   Neurological: She is alert and oriented to person, place, and time.   Skin: Skin is warm and dry.   Psychiatric: She has a normal mood and affect. Her behavior is normal.   Nursing note and vitals reviewed.      cxr 2v in office for cough, SOA, no comparison, shows right hemidiaphragm elevation, NAD awaiting radiology over read.       Assessment/Plan   Hilary was seen today for possible uti and shortness of breath.    Diagnoses and all orders for this visit:    UTI symptoms  -     Urinalysis With Microscopic - Urine, Clean Catch  -     Urinalysis without microscopic (no culture) - Urine, Clean Catch  -     Urinalysis, Microscopic Only - Urine, Clean Catch  -     Urine Culture - Urine, Urine, Clean Catch    SOB (shortness of breath) on exertion  -     CBC & Differential  -     Comprehensive Metabolic Panel  -     proBNP  -     XR Chest 2 View  -     CBC Auto Differential    Cough    Dysuria    Elevated hemidiaphragm  -     CT Chest Without Contrast; Future    Other orders  -     albuterol sulfate  (90 Base) MCG/ACT inhaler; Inhale 2 puffs Every 4 (Four) Hours As Needed for Wheezing or Shortness of Air.  -     Discontinue: azithromycin (ZITHROMAX) 250 MG tablet; Take 2 tablets the first day, then 1 tablet daily for 4 days.  -      doxycycline (VIBRAMYICN) 100 MG tablet; Take 1 tablet by mouth 2 (Two) Times a Day.      Labs today will call with results.   UA today will send for culture and call with results.   Drink plenty of H2O, wipe front to back after urination.   Avoid bladder irritants- coffee, caffeine, carbonation.  Start doxycycline 100mg bid for 7 days,   Albuterol inhaler 2 puffs every 4 hours as needed for wheezing or SOA, educated about use,   cxr today will call with results. CT chest ordered.   Deferred ekg today,   If symptoms persist 5-7 days call office   If any worsening symptoms, SOA, CP, advised ER.   Increase fluid intake, get plenty of rest.   Patient agrees with plan of care and understands instructions. Call if worsening symptoms or any problems or concerns.

## 2020-01-04 LAB — BACTERIA SPEC AEROBE CULT: NORMAL

## 2020-01-06 DIAGNOSIS — R30.0 DYSURIA: Primary | ICD-10-CM

## 2020-01-08 ENCOUNTER — TELEPHONE (OUTPATIENT)
Dept: FAMILY MEDICINE CLINIC | Facility: CLINIC | Age: 40
End: 2020-01-08

## 2020-01-08 NOTE — TELEPHONE ENCOUNTER
PT RECEIVED CALL SAYING HER CHEST XR WAS NORMAL, THEN RECEIVED CALL FROM [facility] TO SCHEDULE A CT CHEST W/O, SAID SHE IS CONFUSED AND ASKED IF SOMEONE COULD LET HER KNOW WHAT HAPPENED?

## 2020-01-08 NOTE — TELEPHONE ENCOUNTER
Her cxr did not show any acute findings, but she did have right hemidiaphragm elevation, needs to cont with CT chest

## 2020-01-17 ENCOUNTER — TELEPHONE (OUTPATIENT)
Dept: FAMILY MEDICINE CLINIC | Facility: CLINIC | Age: 40
End: 2020-01-17

## 2020-01-17 NOTE — TELEPHONE ENCOUNTER
Called to speak with mariano was told she is busy, then was advised to disregard the message from mariano

## 2020-01-21 ENCOUNTER — HOSPITAL ENCOUNTER (OUTPATIENT)
Dept: CT IMAGING | Facility: HOSPITAL | Age: 40
Discharge: HOME | End: 2020-01-21
Admitting: NURSE PRACTITIONER

## 2020-01-21 DIAGNOSIS — J98.6 ELEVATED HEMIDIAPHRAGM: ICD-10-CM

## 2020-01-21 PROCEDURE — 71250 CT THORAX DX C-: CPT

## 2020-01-22 DIAGNOSIS — R91.1 PULMONARY NODULE: Primary | ICD-10-CM

## 2020-02-19 ENCOUNTER — OFFICE VISIT (OUTPATIENT)
Dept: FAMILY MEDICINE CLINIC | Facility: CLINIC | Age: 40
End: 2020-02-19

## 2020-02-19 VITALS
SYSTOLIC BLOOD PRESSURE: 110 MMHG | HEIGHT: 61 IN | WEIGHT: 187 LBS | TEMPERATURE: 98.7 F | HEART RATE: 96 BPM | RESPIRATION RATE: 16 BRPM | BODY MASS INDEX: 35.3 KG/M2 | DIASTOLIC BLOOD PRESSURE: 78 MMHG | OXYGEN SATURATION: 98 %

## 2020-02-19 DIAGNOSIS — R59.1 LYMPHADENOPATHY, GENERALIZED: ICD-10-CM

## 2020-02-19 DIAGNOSIS — K21.9 GERD WITHOUT ESOPHAGITIS: ICD-10-CM

## 2020-02-19 DIAGNOSIS — L04.9: ICD-10-CM

## 2020-02-19 DIAGNOSIS — M79.7 FIBROMYALGIA: Primary | ICD-10-CM

## 2020-02-19 DIAGNOSIS — F41.9 ANXIETY: ICD-10-CM

## 2020-02-19 PROCEDURE — 99214 OFFICE O/P EST MOD 30 MIN: CPT | Performed by: INTERNAL MEDICINE

## 2020-02-19 RX ORDER — CHOLECALCIFEROL (VITAMIN D3) 50 MCG
2000 TABLET ORAL DAILY
Qty: 30 TABLET | Refills: 3 | Status: SHIPPED | OUTPATIENT
Start: 2020-02-19 | End: 2021-02-18

## 2020-02-19 RX ORDER — PROMETHAZINE HYDROCHLORIDE 25 MG/1
25 SUPPOSITORY RECTAL EVERY 6 HOURS PRN
Qty: 30 EACH | Refills: 1 | Status: SHIPPED | OUTPATIENT
Start: 2020-02-19 | End: 2020-03-03 | Stop reason: RX

## 2020-02-19 RX ORDER — OMEPRAZOLE 40 MG/1
40 CAPSULE, DELAYED RELEASE ORAL DAILY
Qty: 30 CAPSULE | Refills: 3 | Status: SHIPPED | OUTPATIENT
Start: 2020-02-19 | End: 2021-07-29

## 2020-02-19 NOTE — PROGRESS NOTES
Subjective   Hilary Willams is a 39 y.o. female.     History of Present Illness   Patient was seen for fibromyalgia.  Patient has constant joint pain and tenderness.  It is  located in her major joints and is a constant dull throb.  She has had this pain for several years.  Patient was advised to go to physical therapy and especially water therapy.  Patient has noticed generalized lymph nodes around her neck axilla and groin area.  Patient referred to general surgeon for possible biopsy.  Patient also has GERD's and was placed on a PPI.  Patient will follow-up in 2 weeks for fibromyalgia..  Patient does have issues with anxiety and depression she was given a list of counselors and advised to contact 1.  Patient is not suicidal    Dictated utilizing Dragon dictation. If there are questions or for further clarification, please contact me.  The following portions of the patient's history were reviewed and updated as appropriate: allergies, current medications, past family history, past medical history, past social history, past surgical history and problem list.    Review of Systems   Constitutional: Negative for fatigue and fever.   HENT: Positive for congestion. Negative for trouble swallowing.    Eyes: Negative for discharge and visual disturbance.   Respiratory: Negative for choking and shortness of breath.    Cardiovascular: Negative for chest pain and palpitations.   Gastrointestinal: Negative for abdominal pain and blood in stool.   Endocrine: Negative.    Genitourinary: Negative for genital sores and hematuria.        Enlarged lymph nodes   Musculoskeletal: Positive for arthralgias, back pain, gait problem, joint swelling, myalgias, neck pain and neck stiffness.   Skin: Negative for color change, pallor, rash and wound.   Allergic/Immunologic: Positive for environmental allergies. Negative for immunocompromised state.   Neurological: Negative for facial asymmetry and speech difficulty.    Psychiatric/Behavioral: Positive for decreased concentration. Negative for hallucinations and suicidal ideas. The patient is nervous/anxious.        Objective   Physical Exam   Constitutional: She is oriented to person, place, and time. She appears well-developed and well-nourished.   HENT:   Head: Normocephalic.   Eyes: Pupils are equal, round, and reactive to light. Conjunctivae are normal.   Neck: Normal range of motion. Neck supple.   Cardiovascular: Normal rate, regular rhythm and normal heart sounds. Exam reveals no gallop and no friction rub.   No murmur heard.  Pulmonary/Chest: Effort normal and breath sounds normal. No stridor. No respiratory distress. She has no wheezes. She has no rales. She exhibits no tenderness.   Abdominal: Soft. Bowel sounds are normal. She exhibits no distension and no mass. There is tenderness. There is no rebound and no guarding. No hernia.   Musculoskeletal: Normal range of motion. She exhibits edema and tenderness. She exhibits no deformity.   Neurological: She is alert and oriented to person, place, and time. A sensory deficit is present. She exhibits abnormal muscle tone.   Skin: Skin is warm and dry.   Psychiatric: Her behavior is normal. Judgment and thought content normal.   With moderate anxiety depression   Nursing note and vitals reviewed.      Assessment/Plan #1 physical therapy #2 orthopedic therapy #3 counselor #4 surgical consult for lymph nodes  Hilary was seen today for results, nausea and acne.    Diagnoses and all orders for this visit:    Fibromyalgia  -     Ambulatory Referral to Physical Therapy Evaluate and treat, Ortho    GERD without esophagitis    Lymphadenopathy, generalized    Acute abscess of lymph node  -     Ambulatory Referral to General Surgery    Anxiety    Other orders  -     Cholecalciferol (VITAMIN D) 50 MCG (2000 UT) tablet; Take 2,000 Units by mouth Daily.  -     promethazine (PHENERGAN) 25 MG suppository; Insert 1 suppository into the  rectum Every 6 (Six) Hours As Needed for Nausea or Vomiting (do not drive).  -     omeprazole (PrilOSEC) 40 MG capsule; Take 1 capsule by mouth Daily.  -     benzoyl peroxide (benzoyl peroxide) 5 % external liquid; Apply  topically to the appropriate area as directed 2 (Two) Times a Day.

## 2020-03-03 ENCOUNTER — OFFICE VISIT (OUTPATIENT)
Dept: SURGERY | Facility: CLINIC | Age: 40
End: 2020-03-03

## 2020-03-03 VITALS — HEART RATE: 101 BPM | WEIGHT: 189 LBS | HEIGHT: 61 IN | OXYGEN SATURATION: 97 % | BODY MASS INDEX: 35.68 KG/M2

## 2020-03-03 DIAGNOSIS — K21.9 GASTROESOPHAGEAL REFLUX DISEASE, ESOPHAGITIS PRESENCE NOT SPECIFIED: ICD-10-CM

## 2020-03-03 DIAGNOSIS — R59.1 LYMPHADENOPATHY: Primary | ICD-10-CM

## 2020-03-03 PROCEDURE — 99244 OFF/OP CNSLTJ NEW/EST MOD 40: CPT | Performed by: SURGERY

## 2020-03-05 NOTE — PROGRESS NOTES
CC: Lymphadenopathy, evaluate for biopsy     HPI: The patient is a very pleasant 39-year-old female that was referred by Dr. Granger for evaluation of lymphadenopathy.  The patient reports recurrent episode of lymphadenopathy that are located over the neck axilla as well as bilateral groins has been going on now for 1 year.  She reports this episode to happen several times a week.  He has been associated with fatigue but no other symptoms including fevers, chills, nausea, vomiting or weight loss.  She was recently found to have vitamin D deficiency and started on vitamin D supplementation that improve her fatigue.  Otherwise she reports acid reflux without dysphagia has been treated with omeprazole.  She has history of gastritis and underwent upper endoscopy several years ago.     PMH: Vitamin D deficiency, acid reflux, gout, anemia, nonalcoholic fatty liver     PSH: Upper endoscopy    MEDS: Allopurinol, Adderall, vitamin D, Prilosec     ALL: Amoxicillin, Cymbalta, penicillins    FH and SH: Patient is  does not smoke and drink alcohol occasionally.  There is no family history of colon cancer or any other hematological condition     ROS:   Constitutional: denies any weight changes, reports fatigue, denies weakness  HEENT: Denies hearing loss and rhinorrhea, reports lymphadenopathy  Cardiovascular: denies chest pain, palpitations, edemas.  Respiratory: denies cough, sputum, SOB.  Reports sleep apnea  Gastrointestinal: denies N&V, abd pain, diarrhea, constipation.  Reports acid reflux  Genitourinary: denies dysuria, reports frequency.  Endocrine: denies cold intolerance, lethargy and flushing.  Hem: denies excessive bruising and postop bleeding.  Musculoskeletal: denies weakness, joint swelling, pain or stiffness.  Reports back pain  Neuro: denies seizures, CVA, paresthesia, or peripheral neuropathy.  Reports dizziness and lightheadedness  Skin: denies change in nevi, rashes, masses.     PE:   Vitals:    03/03/20  "1447   Pulse: 101   SpO2: 97%   Weight: 85.7 kg (189 lb)   Height: 154.9 cm (61\")     Alert and oriented ×3, no acute distress.  Head is normocephalic and atraumatic.  Neck is supple there is no thyromegaly or lymphadenopathy  Chest is clear bilaterally there is no added sounds.    Regular rate and rhythm, no murmurs  Abdomen is soft and nontender, is nondistended, bowel sounds are positive.  There are no palpable inguinal lymphadenopathy  No clubbing cyanosis or edema in lower or upper extremities    Diagnostic studies:   CT scan of the chest without contrast 1/3/2020: Slightly elevated right hemidiaphragm, fatty infiltration of the liver, small left lower nodule, no evidence of enlarged lymphadenopathy    Labs 1/3/2020:   Normal CBC  Normal CMP other than mild elevated ALT  But without signs of infection    Assessment and plan    The patient is a very pleasant 39-year-old female with acid reflux and questionable cervical, axillary and inguinal lymphadenopathy.  She does not have any lymphadenopathy on physical examination.  Her CT scan done 2 months ago does not show any lymphadenopathy either.   I discussed with her that I do not feel any lymphadenopathy that is amenable to biopsy.  I recommend that she get seen by a hematologist to further assess.  All her laboratory results are unremarkable.  I will order CT scan of the abdomen and pelvis to assess for intra-abdominal or inguinal lymphadenopathy.  She has acid reflux and history of gastritis.  She is taking omeprazole with good control of her symptoms.  I think she should undergo upper GI for further evaluation.   Fatty liver, I recommend decrease carbohydrate and fat intake    -Patient verbalized understanding and agreed with plan     Yariel Colorado MD  General, Minimally Invasive and Endoscopic Surgery  [facility]     [address], [address]  [place], [place], [address]  P: [phone]  F: [phone]     "

## 2020-03-09 ENCOUNTER — APPOINTMENT (OUTPATIENT)
Dept: LAB | Facility: HOSPITAL | Age: 40
End: 2020-03-09

## 2020-03-25 ENCOUNTER — APPOINTMENT (OUTPATIENT)
Dept: CT IMAGING | Facility: HOSPITAL | Age: 40
End: 2020-03-25

## 2020-03-31 ENCOUNTER — TELEPHONE (OUTPATIENT)
Dept: SURGERY | Facility: CLINIC | Age: 40
End: 2020-03-31

## 2020-03-31 NOTE — PROGRESS NOTES
Called and lvm for patient regarding orders for Upper GI scan that was scheduled for 3/26/20, but the patient did not show for the scan. Not sure if she decided not to come due to the COVID-19 epidemic or if she feels she does not need the scan anymore. Gave the patient both the [facility] scheduling number if she would like to r/s or if they they to postpone/cx to give our office a call back.

## 2020-03-31 NOTE — TELEPHONE ENCOUNTER
Called and lvm for patient regarding orders for Upper GI scan that was scheduled for 3/26/20, but the patient did not show for the scan. Not sure if she decided not to come due to the COVID-19 epidemic or if she feels she does not need the scan anymore. Gave the patient both the [facility] scheduling number if she would like to r/s or if they they to postpone/cx to give our office a call back.

## 2020-04-07 ENCOUNTER — APPOINTMENT (OUTPATIENT)
Dept: LAB | Facility: HOSPITAL | Age: 40
End: 2020-04-07

## 2020-04-20 RX ORDER — DEXTROAMPHETAMINE SACCHARATE, AMPHETAMINE ASPARTATE, DEXTROAMPHETAMINE SULFATE AND AMPHETAMINE SULFATE 2.5; 2.5; 2.5; 2.5 MG/1; MG/1; MG/1; MG/1
10 TABLET ORAL 2 TIMES DAILY
Qty: 60 TABLET | Refills: 0 | Status: SHIPPED | OUTPATIENT
Start: 2020-04-20 | End: 2020-06-30 | Stop reason: SDUPTHER

## 2020-07-01 RX ORDER — DEXTROAMPHETAMINE SACCHARATE, AMPHETAMINE ASPARTATE, DEXTROAMPHETAMINE SULFATE AND AMPHETAMINE SULFATE 2.5; 2.5; 2.5; 2.5 MG/1; MG/1; MG/1; MG/1
10 TABLET ORAL 2 TIMES DAILY
Qty: 60 TABLET | Refills: 0 | Status: SHIPPED | OUTPATIENT
Start: 2020-07-01 | End: 2020-09-08 | Stop reason: SDUPTHER

## 2020-09-08 RX ORDER — DEXTROAMPHETAMINE SACCHARATE, AMPHETAMINE ASPARTATE, DEXTROAMPHETAMINE SULFATE AND AMPHETAMINE SULFATE 2.5; 2.5; 2.5; 2.5 MG/1; MG/1; MG/1; MG/1
10 TABLET ORAL 2 TIMES DAILY
Qty: 60 TABLET | Refills: 0 | Status: SHIPPED | OUTPATIENT
Start: 2020-09-08 | End: 2020-11-17 | Stop reason: SDUPTHER

## 2020-10-19 ENCOUNTER — TELEPHONE (OUTPATIENT)
Dept: FAMILY MEDICINE CLINIC | Facility: CLINIC | Age: 40
End: 2020-10-19

## 2020-10-19 NOTE — TELEPHONE ENCOUNTER
Caller: Hilary Willams    Relationship to patient: Self    Best call back number: [phone]   Concerns or Questions if Applicable: CHEST CONGESTION, COUGH.TEMP 99.0    SHE WOULD LIKE TO KNOW IF SHE SHOULD GET TESTED FOR COVID 19   Travel screen questions: RECENTLY WENT TO A    0

## 2020-10-19 NOTE — TELEPHONE ENCOUNTER
Called patient l/m on v/m we dont do testing here can go to a [facility] or google for local testing in her area  And to call back if any problem

## 2020-11-17 RX ORDER — DEXTROAMPHETAMINE SACCHARATE, AMPHETAMINE ASPARTATE, DEXTROAMPHETAMINE SULFATE AND AMPHETAMINE SULFATE 2.5; 2.5; 2.5; 2.5 MG/1; MG/1; MG/1; MG/1
10 TABLET ORAL 2 TIMES DAILY
Qty: 60 TABLET | Refills: 0 | Status: CANCELLED | OUTPATIENT
Start: 2020-11-17

## 2020-11-17 RX ORDER — DEXTROAMPHETAMINE SACCHARATE, AMPHETAMINE ASPARTATE, DEXTROAMPHETAMINE SULFATE AND AMPHETAMINE SULFATE 2.5; 2.5; 2.5; 2.5 MG/1; MG/1; MG/1; MG/1
10 TABLET ORAL 2 TIMES DAILY
Qty: 60 TABLET | Refills: 0 | Status: SHIPPED | OUTPATIENT
Start: 2020-11-17 | End: 2021-01-30 | Stop reason: SDUPTHER

## 2020-11-17 NOTE — TELEPHONE ENCOUNTER
Patient has not been seen since March.  Patient has to be seen every 4 months.  Last refill till seen

## 2021-01-06 DIAGNOSIS — J98.6 ELEVATED HEMIDIAPHRAGM: Primary | ICD-10-CM

## 2021-02-01 RX ORDER — DEXTROAMPHETAMINE SACCHARATE, AMPHETAMINE ASPARTATE, DEXTROAMPHETAMINE SULFATE AND AMPHETAMINE SULFATE 2.5; 2.5; 2.5; 2.5 MG/1; MG/1; MG/1; MG/1
10 TABLET ORAL 2 TIMES DAILY
Qty: 60 TABLET | Refills: 0 | Status: SHIPPED | OUTPATIENT
Start: 2021-02-01 | End: 2021-04-05 | Stop reason: SDUPTHER

## 2021-04-05 RX ORDER — DEXTROAMPHETAMINE SACCHARATE, AMPHETAMINE ASPARTATE, DEXTROAMPHETAMINE SULFATE AND AMPHETAMINE SULFATE 2.5; 2.5; 2.5; 2.5 MG/1; MG/1; MG/1; MG/1
10 TABLET ORAL 2 TIMES DAILY
Qty: 60 TABLET | Refills: 0 | Status: SHIPPED | OUTPATIENT
Start: 2021-04-05 | End: 2021-06-30 | Stop reason: SDUPTHER

## 2021-04-16 ENCOUNTER — BULK ORDERING (OUTPATIENT)
Dept: CASE MANAGEMENT | Facility: OTHER | Age: 41
End: 2021-04-16

## 2021-04-16 DIAGNOSIS — Z23 IMMUNIZATION DUE: ICD-10-CM

## 2021-06-30 RX ORDER — DEXTROAMPHETAMINE SACCHARATE, AMPHETAMINE ASPARTATE, DEXTROAMPHETAMINE SULFATE AND AMPHETAMINE SULFATE 2.5; 2.5; 2.5; 2.5 MG/1; MG/1; MG/1; MG/1
10 TABLET ORAL 2 TIMES DAILY
Qty: 60 TABLET | Refills: 0 | Status: SHIPPED | OUTPATIENT
Start: 2021-06-30 | End: 2021-09-01 | Stop reason: SDUPTHER

## 2021-07-29 ENCOUNTER — OFFICE VISIT (OUTPATIENT)
Dept: FAMILY MEDICINE CLINIC | Facility: CLINIC | Age: 41
End: 2021-07-29

## 2021-07-29 VITALS
BODY MASS INDEX: 33.91 KG/M2 | OXYGEN SATURATION: 97 % | TEMPERATURE: 98.7 F | DIASTOLIC BLOOD PRESSURE: 68 MMHG | SYSTOLIC BLOOD PRESSURE: 118 MMHG | HEIGHT: 61 IN | HEART RATE: 102 BPM | WEIGHT: 179.6 LBS

## 2021-07-29 DIAGNOSIS — Z00.00 PHYSICAL EXAM, ANNUAL: Primary | ICD-10-CM

## 2021-07-29 DIAGNOSIS — E79.0 HYPERURICEMIA: ICD-10-CM

## 2021-07-29 DIAGNOSIS — Z79.899 DRUG THERAPY: ICD-10-CM

## 2021-07-29 DIAGNOSIS — R21 RASH: ICD-10-CM

## 2021-07-29 LAB
25(OH)D3 SERPL-MCNC: 51.7 NG/ML (ref 30–100)
ALBUMIN SERPL-MCNC: 4.4 G/DL (ref 3.5–5.2)
ALBUMIN/GLOB SERPL: 1.4 G/DL
ALP SERPL-CCNC: 49 U/L (ref 39–117)
ALT SERPL W P-5'-P-CCNC: 34 U/L (ref 1–33)
ANION GAP SERPL CALCULATED.3IONS-SCNC: 11.3 MMOL/L (ref 5–15)
AST SERPL-CCNC: 19 U/L (ref 1–32)
BILIRUB SERPL-MCNC: 0.3 MG/DL (ref 0–1.2)
BUN SERPL-MCNC: 6 MG/DL (ref 6–20)
BUN/CREAT SERPL: 8.3 (ref 7–25)
CALCIUM SPEC-SCNC: 9.3 MG/DL (ref 8.6–10.5)
CHLORIDE SERPL-SCNC: 104 MMOL/L (ref 98–107)
CHROMATIN AB SERPL-ACNC: <10 IU/ML (ref 0–14)
CO2 SERPL-SCNC: 22.7 MMOL/L (ref 22–29)
CREAT SERPL-MCNC: 0.72 MG/DL (ref 0.57–1)
DEPRECATED RDW RBC AUTO: 39.2 FL (ref 37–54)
ERYTHROCYTE [DISTWIDTH] IN BLOOD BY AUTOMATED COUNT: 12.2 % (ref 12.3–15.4)
ERYTHROCYTE [SEDIMENTATION RATE] IN BLOOD: 14 MM/HR (ref 0–20)
GFR SERPL CREATININE-BSD FRML MDRD: 90 ML/MIN/1.73
GLOBULIN UR ELPH-MCNC: 3.2 GM/DL
GLUCOSE SERPL-MCNC: 91 MG/DL (ref 65–99)
HCT VFR BLD AUTO: 47.8 % (ref 34–46.6)
HGB BLD-MCNC: 15.5 G/DL (ref 12–15.9)
MCH RBC QN AUTO: 28.5 PG (ref 26.6–33)
MCHC RBC AUTO-ENTMCNC: 32.4 G/DL (ref 31.5–35.7)
MCV RBC AUTO: 88 FL (ref 79–97)
PLATELET # BLD AUTO: 412 10*3/MM3 (ref 140–450)
PMV BLD AUTO: 9.8 FL (ref 6–12)
POTASSIUM SERPL-SCNC: 3.9 MMOL/L (ref 3.5–5.2)
PROT SERPL-MCNC: 7.6 G/DL (ref 6–8.5)
RBC # BLD AUTO: 5.43 10*6/MM3 (ref 3.77–5.28)
SODIUM SERPL-SCNC: 138 MMOL/L (ref 136–145)
URATE SERPL-MCNC: 6.1 MG/DL (ref 2.4–5.7)
WBC # BLD AUTO: 6.86 10*3/MM3 (ref 3.4–10.8)

## 2021-07-29 PROCEDURE — 80053 COMPREHEN METABOLIC PANEL: CPT | Performed by: INTERNAL MEDICINE

## 2021-07-29 PROCEDURE — 82306 VITAMIN D 25 HYDROXY: CPT | Performed by: INTERNAL MEDICINE

## 2021-07-29 PROCEDURE — 85027 COMPLETE CBC AUTOMATED: CPT | Performed by: INTERNAL MEDICINE

## 2021-07-29 PROCEDURE — 99396 PREV VISIT EST AGE 40-64: CPT | Performed by: INTERNAL MEDICINE

## 2021-07-29 PROCEDURE — 85652 RBC SED RATE AUTOMATED: CPT | Performed by: INTERNAL MEDICINE

## 2021-07-29 PROCEDURE — 86431 RHEUMATOID FACTOR QUANT: CPT | Performed by: INTERNAL MEDICINE

## 2021-07-29 PROCEDURE — 84550 ASSAY OF BLOOD/URIC ACID: CPT | Performed by: INTERNAL MEDICINE

## 2021-07-29 PROCEDURE — 36415 COLL VENOUS BLD VENIPUNCTURE: CPT | Performed by: INTERNAL MEDICINE

## 2021-07-29 RX ORDER — METRONIDAZOLE 7.5 MG/G
GEL TOPICAL 2 TIMES DAILY
Qty: 45 G | Refills: 1 | Status: SHIPPED | OUTPATIENT
Start: 2021-07-29 | End: 2021-11-30 | Stop reason: ALTCHOICE

## 2021-07-29 RX ORDER — SULFAMETHOXAZOLE AND TRIMETHOPRIM 800; 160 MG/1; MG/1
1 TABLET ORAL 2 TIMES DAILY
Qty: 20 TABLET | Refills: 0 | Status: SHIPPED | OUTPATIENT
Start: 2021-07-29 | End: 2021-11-30 | Stop reason: ALTCHOICE

## 2021-07-29 NOTE — PROGRESS NOTES
Subjective   Hilary Willams is a 41 y.o. female.     Vitals:    07/29/21 1216   BP: 118/68   Pulse: 102   Temp: 98.7 °F (37.1 °C)   SpO2: 97%      Body mass index is 33.94 kg/m².     History of Present Illness   Patient was seen for a physical.  Patient's diet physical activity were discussed at this visit.  Patient had a for cystocele or macular rash on her right upper quadrant of her right breast.  It appeared to be possible MRSA.  Patient also had a butterfly type rash on her face.  Patient is being referred to dermatology.  Patient was placed on Bactrim DS 1 p.o. twice daily with mupirocin twice daily.  Patient was placed on metronidazole gel 0.075% twice daily for possible rosacea.  Patient also had labs drawn for lupus.    Dictated utilizing Dragon dictation. If there are questions or for further clarification, please contact me.  The following portions of the patient's history were reviewed and updated as appropriate: allergies, current medications, past family history, past medical history, past social history, past surgical history and problem list.    Review of Systems   Constitutional: Negative for fatigue and fever.   HENT: Positive for congestion. Negative for trouble swallowing.    Eyes: Negative for discharge and visual disturbance.   Respiratory: Negative for choking and shortness of breath.    Cardiovascular: Negative for chest pain and palpitations.   Gastrointestinal: Negative for abdominal pain and blood in stool.   Endocrine: Negative.    Genitourinary: Negative for genital sores and hematuria.   Musculoskeletal: Negative for gait problem and joint swelling.   Skin: Positive for rash. Negative for color change, pallor and wound.   Allergic/Immunologic: Positive for environmental allergies. Negative for immunocompromised state.   Neurological: Negative for facial asymmetry and speech difficulty.   Psychiatric/Behavioral: Negative for hallucinations and suicidal ideas.       Objective    Physical Exam  Vitals and nursing note reviewed.   Constitutional:       General: She is not in acute distress.     Appearance: Normal appearance. She is well-developed. She is not ill-appearing, toxic-appearing or diaphoretic.   HENT:      Head: Normocephalic and atraumatic.      Right Ear: Tympanic membrane, ear canal and external ear normal. There is no impacted cerumen.      Left Ear: Tympanic membrane, ear canal and external ear normal. There is no impacted cerumen.      Nose: Nose normal. No congestion or rhinorrhea.      Mouth/Throat:      Mouth: Mucous membranes are moist.      Pharynx: Oropharynx is clear. No oropharyngeal exudate or posterior oropharyngeal erythema.   Eyes:      General: No scleral icterus.        Right eye: No discharge.         Left eye: No discharge.      Extraocular Movements: Extraocular movements intact.      Conjunctiva/sclera: Conjunctivae normal.      Pupils: Pupils are equal, round, and reactive to light.   Neck:      Thyroid: No thyromegaly.      Vascular: No carotid bruit or JVD.      Trachea: No tracheal deviation.   Cardiovascular:      Rate and Rhythm: Normal rate and regular rhythm.      Heart sounds: Normal heart sounds. No murmur heard.   No friction rub. No gallop.    Pulmonary:      Effort: Pulmonary effort is normal. No respiratory distress.      Breath sounds: Normal breath sounds. No stridor. No wheezing, rhonchi or rales.   Chest:      Chest wall: No tenderness.   Abdominal:      General: Bowel sounds are normal. There is no distension.      Palpations: Abdomen is soft. There is no mass.      Tenderness: There is no abdominal tenderness. There is no right CVA tenderness, left CVA tenderness, guarding or rebound.      Hernia: No hernia is present.   Musculoskeletal:         General: No swelling, tenderness, deformity or signs of injury. Normal range of motion.      Cervical back: Normal range of motion and neck supple. No rigidity. No muscular tenderness.       Right lower leg: No edema.      Left lower leg: No edema.   Lymphadenopathy:      Cervical: No cervical adenopathy.   Skin:     General: Skin is warm and dry.      Coloration: Skin is not jaundiced or pale.      Findings: Rash present. No bruising, erythema or lesion.   Neurological:      General: No focal deficit present.      Mental Status: She is alert and oriented to person, place, and time. Mental status is at baseline.      Cranial Nerves: No cranial nerve deficit.      Sensory: No sensory deficit.      Motor: No weakness or abnormal muscle tone.      Coordination: Coordination normal.      Gait: Gait normal.      Deep Tendon Reflexes: Reflexes normal.   Psychiatric:         Mood and Affect: Mood normal.         Behavior: Behavior normal.         Thought Content: Thought content normal.         Judgment: Judgment normal.         Assessment/Plan #1 physical 2 labs 3 Bactrim DS 1 twice daily, mupirocin twice daily #4 dermatology consult 5 metronidazole 0.075% twice daily  Problems Addressed this Visit     Musculoskeletal and Injuries            Hyperuricemia    Relevant Orders    CBC (No Diff) (Completed)    Comprehensive Metabolic Panel (Completed)    Vitamin D 25 Hydroxy (Completed)    KEISHA    Rheumatoid Factor (Completed)    Sedimentation Rate (Completed)    Uric Acid (Completed)    Lipid Panel            Other Visit Diagnoses     Physical exam, annual    -  Primary    Relevant Orders    Lipid Panel    Rash        Relevant Medications    mupirocin (Bactroban) 2 % ointment    metroNIDAZOLE (METROGEL) 0.75 % gel    Other Relevant Orders    Ambulatory Referral to Dermatology    Lipid Panel    Drug therapy        Relevant Orders    Compliance Drug Analysis, Ur - Urine, Clean Catch    Lipid Panel      Diagnoses     Diagnosis Codes Comments    Physical exam, annual    -  Primary ICD-10-CM: Z00.00  ICD-9-CM: V70.0     Hyperuricemia     ICD-10-CM: E79.0  ICD-9-CM: 790.6     Rash     ICD-10-CM: R21  ICD-9-CM: 782.1      Drug therapy     ICD-10-CM: Z79.899  ICD-9-CM: V58.69

## 2021-07-30 LAB — ANA SER QL: NEGATIVE

## 2021-08-05 LAB — DRUGS UR: NORMAL

## 2021-09-01 RX ORDER — DEXTROAMPHETAMINE SACCHARATE, AMPHETAMINE ASPARTATE, DEXTROAMPHETAMINE SULFATE AND AMPHETAMINE SULFATE 2.5; 2.5; 2.5; 2.5 MG/1; MG/1; MG/1; MG/1
10 TABLET ORAL 2 TIMES DAILY
Qty: 60 TABLET | Refills: 0 | Status: SHIPPED | OUTPATIENT
Start: 2021-09-01 | End: 2021-11-18 | Stop reason: SDUPTHER

## 2021-11-18 RX ORDER — DEXTROAMPHETAMINE SACCHARATE, AMPHETAMINE ASPARTATE, DEXTROAMPHETAMINE SULFATE AND AMPHETAMINE SULFATE 2.5; 2.5; 2.5; 2.5 MG/1; MG/1; MG/1; MG/1
10 TABLET ORAL 2 TIMES DAILY
Qty: 60 TABLET | Refills: 0 | Status: SHIPPED | OUTPATIENT
Start: 2021-11-18 | End: 2022-02-06 | Stop reason: SDUPTHER

## 2021-11-30 ENCOUNTER — OFFICE VISIT (OUTPATIENT)
Dept: FAMILY MEDICINE CLINIC | Facility: CLINIC | Age: 41
End: 2021-11-30

## 2021-11-30 DIAGNOSIS — L73.2 HIDRADENITIS SUPPURATIVA: Primary | ICD-10-CM

## 2021-11-30 PROCEDURE — 99441 PR PHYS/QHP TELEPHONE EVALUATION 5-10 MIN: CPT | Performed by: INTERNAL MEDICINE

## 2021-11-30 RX ORDER — ACETIC ACID 5 %
30 LIQUID (ML) MISCELLANEOUS 2 TIMES DAILY
Qty: 500 ML | Refills: 0 | COMMUNITY
Start: 2021-11-30 | End: 2023-02-14

## 2021-11-30 RX ORDER — CLINDAMYCIN PHOSPHATE 10 UG/ML
LOTION TOPICAL 2 TIMES DAILY
Qty: 60 ML
Start: 2021-11-30

## 2021-11-30 NOTE — PROGRESS NOTES
Subjective   Hilary Willams is a 41 y.o. female.     There were no vitals filed for this visit.   There is no height or weight on file to calculate BMI.     History of Present Illness   You have chosen to receive care through a telephone visit. Do you consent to use a telephone visit for your medical care today? Yes  Patient was seen for a 10-minute phone encounter.  Patient went to a dermatologist and was diagnosed with hydradenitis suppurtiva and was placed on clindamycin gel.  Patient was also diagnosed with acne rosacea and the MetroGel was not working.  Patient was changed to acetic acid.  Patient is going to a dermatologist.  Patient will continue present treatment.    Dictated utilizing Dragon dictation. If there are questions or for further clarification, please contact me.  The following portions of the patient's history were reviewed and updated as appropriate: allergies, current medications, past family history, past medical history, past social history, past surgical history and problem list.    Review of Systems   Constitutional: Negative for fatigue and fever.   HENT: Positive for congestion. Negative for trouble swallowing.    Eyes: Negative for discharge and visual disturbance.   Respiratory: Negative for choking and shortness of breath.    Cardiovascular: Negative for chest pain and palpitations.   Gastrointestinal: Negative for abdominal pain and blood in stool.   Endocrine: Negative.    Genitourinary: Negative for genital sores and hematuria.   Musculoskeletal: Negative for gait problem and joint swelling.   Skin: Positive for rash. Negative for color change, pallor and wound.   Allergic/Immunologic: Positive for environmental allergies. Negative for immunocompromised state.   Neurological: Negative for facial asymmetry and speech difficulty.   Psychiatric/Behavioral: Negative for hallucinations and suicidal ideas.       Objective   Physical Exam  Pulmonary:      Effort: Pulmonary effort is  normal.      Breath sounds: Normal breath sounds.   Neurological:      General: No focal deficit present.      Mental Status: She is oriented to person, place, and time. Mental status is at baseline.   Psychiatric:         Mood and Affect: Mood normal.         Behavior: Behavior normal.         Thought Content: Thought content normal.         Judgment: Judgment normal.         Assessment/Plan #1 continue medication dermatologist recommends #2 follow-up with dermatology  Problems Addressed this Visit     None      Visit Diagnoses     Hidradenitis suppurativa    -  Primary    Relevant Medications    clindamycin (CLEOCIN T) 1 % lotion      Diagnoses     Diagnosis Codes Comments    Hidradenitis suppurativa    -  Primary ICD-10-CM: L73.2  ICD-9-CM: 705.83

## 2022-02-07 RX ORDER — DEXTROAMPHETAMINE SACCHARATE, AMPHETAMINE ASPARTATE, DEXTROAMPHETAMINE SULFATE AND AMPHETAMINE SULFATE 2.5; 2.5; 2.5; 2.5 MG/1; MG/1; MG/1; MG/1
10 TABLET ORAL 2 TIMES DAILY
Qty: 60 TABLET | Refills: 0 | Status: SHIPPED | OUTPATIENT
Start: 2022-02-07 | End: 2022-04-24 | Stop reason: SDUPTHER

## 2022-04-25 RX ORDER — DEXTROAMPHETAMINE SACCHARATE, AMPHETAMINE ASPARTATE, DEXTROAMPHETAMINE SULFATE AND AMPHETAMINE SULFATE 2.5; 2.5; 2.5; 2.5 MG/1; MG/1; MG/1; MG/1
10 TABLET ORAL 2 TIMES DAILY
Qty: 60 TABLET | Refills: 0 | Status: SHIPPED | OUTPATIENT
Start: 2022-04-25 | End: 2022-07-13 | Stop reason: SDUPTHER

## 2022-07-13 RX ORDER — DEXTROAMPHETAMINE SACCHARATE, AMPHETAMINE ASPARTATE, DEXTROAMPHETAMINE SULFATE AND AMPHETAMINE SULFATE 2.5; 2.5; 2.5; 2.5 MG/1; MG/1; MG/1; MG/1
10 TABLET ORAL 2 TIMES DAILY
Qty: 60 TABLET | Refills: 0 | Status: SHIPPED | OUTPATIENT
Start: 2022-07-13 | End: 2022-12-12 | Stop reason: SDUPTHER

## 2022-12-12 RX ORDER — DEXTROAMPHETAMINE SACCHARATE, AMPHETAMINE ASPARTATE, DEXTROAMPHETAMINE SULFATE AND AMPHETAMINE SULFATE 2.5; 2.5; 2.5; 2.5 MG/1; MG/1; MG/1; MG/1
10 TABLET ORAL 2 TIMES DAILY
Qty: 30 TABLET | Refills: 0 | Status: SHIPPED | OUTPATIENT
Start: 2022-12-12 | End: 2023-02-07 | Stop reason: SDUPTHER

## 2023-02-08 RX ORDER — DEXTROAMPHETAMINE SACCHARATE, AMPHETAMINE ASPARTATE, DEXTROAMPHETAMINE SULFATE AND AMPHETAMINE SULFATE 2.5; 2.5; 2.5; 2.5 MG/1; MG/1; MG/1; MG/1
10 TABLET ORAL 2 TIMES DAILY
Qty: 30 TABLET | Refills: 0 | Status: SHIPPED | OUTPATIENT
Start: 2023-02-08

## 2023-02-14 ENCOUNTER — OFFICE VISIT (OUTPATIENT)
Dept: FAMILY MEDICINE CLINIC | Facility: CLINIC | Age: 43
End: 2023-02-14
Payer: COMMERCIAL

## 2023-02-14 VITALS
DIASTOLIC BLOOD PRESSURE: 68 MMHG | HEART RATE: 62 BPM | HEIGHT: 61 IN | SYSTOLIC BLOOD PRESSURE: 118 MMHG | OXYGEN SATURATION: 97 % | WEIGHT: 180 LBS | BODY MASS INDEX: 33.99 KG/M2 | TEMPERATURE: 98.6 F

## 2023-02-14 DIAGNOSIS — E79.0 HYPERURICEMIA: Primary | ICD-10-CM

## 2023-02-14 DIAGNOSIS — Z00.00 PHYSICAL EXAM, ANNUAL: Primary | ICD-10-CM

## 2023-02-14 DIAGNOSIS — Z13.220 LIPID SCREENING: ICD-10-CM

## 2023-02-14 DIAGNOSIS — E55.9 VITAMIN D DEFICIENCY: ICD-10-CM

## 2023-02-14 PROCEDURE — 99396 PREV VISIT EST AGE 40-64: CPT | Performed by: INTERNAL MEDICINE

## 2023-02-14 NOTE — PROGRESS NOTES
"Chief Complaint  fasting bloodwork and Annual Exam    Subjective        Hilary Willams presents to [facility]  History of Present Illness patient was seen for a physical.  Patient's diet physical activity discussed this visit.    Objective   Vital Signs:  Blood Pressure 118/68   Pulse 62   Temperature 98.6 °F (37 °C)   Height 154.9 cm (61\")   Weight 81.6 kg (180 lb)   Oxygen Saturation 97%   Body Mass Index 34.01 kg/m²   Estimated body mass index is 34.01 kg/m² as calculated from the following:    Height as of this encounter: 154.9 cm (61\").    Weight as of this encounter: 81.6 kg (180 lb).             Physical Exam  Vitals and nursing note reviewed.   Constitutional:       General: She is not in acute distress.     Appearance: Normal appearance. She is well-developed. She is not ill-appearing, toxic-appearing or diaphoretic.   HENT:      Head: Normocephalic and atraumatic.      Right Ear: Tympanic membrane, ear canal and external ear normal. There is no impacted cerumen.      Left Ear: Tympanic membrane, ear canal and external ear normal. There is no impacted cerumen.      Nose: Nose normal. No congestion or rhinorrhea.      Mouth/Throat:      Mouth: Mucous membranes are moist.      Pharynx: Oropharynx is clear. No oropharyngeal exudate or posterior oropharyngeal erythema.   Eyes:      General: No scleral icterus.        Right eye: No discharge.         Left eye: No discharge.      Extraocular Movements: Extraocular movements intact.      Conjunctiva/sclera: Conjunctivae normal.      Pupils: Pupils are equal, round, and reactive to light.   Neck:      Thyroid: No thyromegaly.      Vascular: No carotid bruit or JVD.      Trachea: No tracheal deviation.   Cardiovascular:      Rate and Rhythm: Normal rate and regular rhythm.      Heart sounds: Normal heart sounds. No murmur heard.    No friction rub. No gallop.   Pulmonary:      Effort: Pulmonary effort is normal. No respiratory " distress.      Breath sounds: Normal breath sounds. No stridor. No wheezing, rhonchi or rales.   Chest:      Chest wall: No tenderness.   Abdominal:      General: Bowel sounds are normal. There is no distension.      Palpations: Abdomen is soft. There is no mass.      Tenderness: There is no abdominal tenderness. There is no right CVA tenderness, left CVA tenderness, guarding or rebound.      Hernia: No hernia is present.   Musculoskeletal:         General: No swelling, tenderness, deformity or signs of injury. Normal range of motion.      Cervical back: Normal range of motion and neck supple. No rigidity. No muscular tenderness.      Right lower leg: No edema.      Left lower leg: No edema.   Lymphadenopathy:      Cervical: No cervical adenopathy.   Skin:     General: Skin is warm and dry.      Coloration: Skin is not jaundiced or pale.      Findings: No bruising, erythema, lesion or rash.   Neurological:      General: No focal deficit present.      Mental Status: She is alert and oriented to person, place, and time. Mental status is at baseline.      Cranial Nerves: No cranial nerve deficit.      Sensory: No sensory deficit.      Motor: No weakness or abnormal muscle tone.      Coordination: Coordination normal.      Gait: Gait normal.      Deep Tendon Reflexes: Reflexes normal.   Psychiatric:         Mood and Affect: Mood normal.         Behavior: Behavior normal.         Thought Content: Thought content normal.         Judgment: Judgment normal.        Result Review :                   Assessment and Plan  #1 physical #2 labs  Diagnoses and all orders for this visit:    1. Physical exam, annual (Primary)             Follow Up   Return in about 4 months (around 6/14/2023), or if symptoms worsen or fail to improve, for Recheck.  Patient was given instructions and counseling regarding her condition or for health maintenance advice. Please see specific information pulled into the AVS if appropriate.

## 2023-02-15 LAB
25(OH)D3+25(OH)D2 SERPL-MCNC: 40.8 NG/ML (ref 30–100)
ALBUMIN SERPL-MCNC: 4.9 G/DL (ref 3.8–4.8)
ALBUMIN/GLOB SERPL: 1.7 {RATIO} (ref 1.2–2.2)
ALP SERPL-CCNC: 61 IU/L (ref 44–121)
ALT SERPL-CCNC: 21 IU/L (ref 0–32)
AST SERPL-CCNC: 20 IU/L (ref 0–40)
BILIRUB SERPL-MCNC: 0.4 MG/DL (ref 0–1.2)
BUN SERPL-MCNC: 8 MG/DL (ref 6–24)
BUN/CREAT SERPL: 8 (ref 9–23)
CALCIUM SERPL-MCNC: 9.8 MG/DL (ref 8.7–10.2)
CHLORIDE SERPL-SCNC: 101 MMOL/L (ref 96–106)
CHOLEST SERPL-MCNC: 180 MG/DL (ref 100–199)
CO2 SERPL-SCNC: 20 MMOL/L (ref 20–29)
CREAT SERPL-MCNC: 0.95 MG/DL (ref 0.57–1)
EGFRCR SERPLBLD CKD-EPI 2021: 77 ML/MIN/1.73
ERYTHROCYTE [DISTWIDTH] IN BLOOD BY AUTOMATED COUNT: 12.6 % (ref 11.7–15.4)
GLOBULIN SER CALC-MCNC: 2.9 G/DL (ref 1.5–4.5)
GLUCOSE SERPL-MCNC: 90 MG/DL (ref 70–99)
HCT VFR BLD AUTO: 47 % (ref 34–46.6)
HDLC SERPL-MCNC: 49 MG/DL
HGB BLD-MCNC: 15.8 G/DL (ref 11.1–15.9)
LDLC SERPL CALC-MCNC: 114 MG/DL (ref 0–99)
MCH RBC QN AUTO: 28.5 PG (ref 26.6–33)
MCHC RBC AUTO-ENTMCNC: 33.6 G/DL (ref 31.5–35.7)
MCV RBC AUTO: 85 FL (ref 79–97)
PLATELET # BLD AUTO: 400 X10E3/UL (ref 150–450)
POTASSIUM SERPL-SCNC: 4 MMOL/L (ref 3.5–5.2)
PROT SERPL-MCNC: 7.8 G/DL (ref 6–8.5)
RBC # BLD AUTO: 5.54 X10E6/UL (ref 3.77–5.28)
SODIUM SERPL-SCNC: 136 MMOL/L (ref 134–144)
TRIGL SERPL-MCNC: 91 MG/DL (ref 0–149)
URATE SERPL-MCNC: 6.5 MG/DL (ref 2.6–6.2)
VLDLC SERPL CALC-MCNC: 17 MG/DL (ref 5–40)
WBC # BLD AUTO: 8.1 X10E3/UL (ref 3.4–10.8)

## 2023-02-27 ENCOUNTER — OFFICE VISIT (OUTPATIENT)
Dept: FAMILY MEDICINE CLINIC | Facility: CLINIC | Age: 43
End: 2023-02-27
Payer: COMMERCIAL

## 2023-02-27 VITALS
SYSTOLIC BLOOD PRESSURE: 132 MMHG | BODY MASS INDEX: 34.32 KG/M2 | OXYGEN SATURATION: 97 % | HEIGHT: 61 IN | WEIGHT: 181.8 LBS | DIASTOLIC BLOOD PRESSURE: 84 MMHG | TEMPERATURE: 99.1 F | HEART RATE: 106 BPM

## 2023-02-27 DIAGNOSIS — Z48.02 VISIT FOR SUTURE REMOVAL: ICD-10-CM

## 2023-02-27 DIAGNOSIS — S01.01XD LACERATION OF SCALP, SUBSEQUENT ENCOUNTER: Primary | ICD-10-CM

## 2023-02-27 PROCEDURE — 99213 OFFICE O/P EST LOW 20 MIN: CPT | Performed by: NURSE PRACTITIONER

## 2023-03-02 PROBLEM — Z48.02 VISIT FOR SUTURE REMOVAL: Status: ACTIVE | Noted: 2023-03-02

## 2023-03-02 PROBLEM — S01.01XA SCALP LACERATION: Status: ACTIVE | Noted: 2023-03-02

## 2023-12-27 ENCOUNTER — TRANSCRIBE ORDERS (OUTPATIENT)
Dept: ADMINISTRATIVE | Facility: HOSPITAL | Age: 43
End: 2023-12-27
Payer: COMMERCIAL

## 2023-12-27 DIAGNOSIS — R13.10 DYSPHAGIA, UNSPECIFIED TYPE: Primary | ICD-10-CM

## 2024-01-04 ENCOUNTER — HOSPITAL ENCOUNTER (OUTPATIENT)
Facility: HOSPITAL | Age: 44
Discharge: HOME | End: 2024-01-04
Admitting: INTERNAL MEDICINE
Payer: COMMERCIAL

## 2024-01-04 DIAGNOSIS — R13.10 DYSPHAGIA, UNSPECIFIED TYPE: ICD-10-CM

## 2024-01-04 PROCEDURE — 76700 US EXAM ABDOM COMPLETE: CPT

## 2024-01-24 ENCOUNTER — TRANSCRIBE ORDERS (OUTPATIENT)
Dept: ADMINISTRATIVE | Facility: HOSPITAL | Age: 44
End: 2024-01-24
Payer: COMMERCIAL

## 2024-01-24 DIAGNOSIS — R13.10 DYSPHAGIA, UNSPECIFIED TYPE: Primary | ICD-10-CM

## 2024-02-26 ENCOUNTER — HOSPITAL ENCOUNTER (OUTPATIENT)
Dept: GENERAL RADIOLOGY | Facility: HOSPITAL | Age: 44
Discharge: HOME | End: 2024-02-26
Admitting: INTERNAL MEDICINE
Payer: COMMERCIAL

## 2024-02-26 DIAGNOSIS — R13.10 DYSPHAGIA, UNSPECIFIED TYPE: ICD-10-CM

## 2024-02-26 PROCEDURE — 74221 X-RAY XM ESOPHAGUS 2CNTRST: CPT

## 2024-02-26 RX ADMIN — ANTACID/ANTIFLATULENT 1 PACKET: 380; 550; 10; 10 GRANULE, EFFERVESCENT ORAL at 10:24

## 2024-02-26 RX ADMIN — BARIUM SULFATE 135 ML: 980 POWDER, FOR SUSPENSION ORAL at 10:24

## 2024-02-26 RX ADMIN — BARIUM SULFATE 700 MG: 700 TABLET ORAL at 10:24
